# Patient Record
Sex: FEMALE | Race: WHITE | NOT HISPANIC OR LATINO | Employment: OTHER | URBAN - NONMETROPOLITAN AREA
[De-identification: names, ages, dates, MRNs, and addresses within clinical notes are randomized per-mention and may not be internally consistent; named-entity substitution may affect disease eponyms.]

---

## 2017-02-06 ENCOUNTER — TRANSCRIBE ORDERS (OUTPATIENT)
Dept: FAMILY MEDICINE CLINIC | Facility: CLINIC | Age: 57
End: 2017-02-06

## 2017-02-06 ENCOUNTER — LAB (OUTPATIENT)
Dept: LAB | Facility: OTHER | Age: 57
End: 2017-02-06

## 2017-02-06 DIAGNOSIS — I10 PRIMARY HYPERTENSION: ICD-10-CM

## 2017-02-06 DIAGNOSIS — I10 PRIMARY HYPERTENSION: Primary | ICD-10-CM

## 2017-02-06 LAB
ALBUMIN SERPL-MCNC: 4.1 G/DL (ref 3.2–5.5)
ALBUMIN/GLOB SERPL: 1.3 G/DL (ref 1–3)
ALP SERPL-CCNC: 83 U/L (ref 15–121)
ALT SERPL W P-5'-P-CCNC: 19 U/L (ref 10–60)
ANION GAP SERPL CALCULATED.3IONS-SCNC: 10 MMOL/L (ref 5–15)
AST SERPL-CCNC: 23 U/L (ref 10–60)
BASOPHILS # BLD AUTO: 0.02 10*3/MM3 (ref 0–0.2)
BASOPHILS NFR BLD AUTO: 0.4 % (ref 0–2)
BILIRUB SERPL-MCNC: 1 MG/DL (ref 0.2–1)
BUN BLD-MCNC: 20 MG/DL (ref 8–25)
BUN/CREAT SERPL: 40 (ref 7–25)
CALCIUM SPEC-SCNC: 9 MG/DL (ref 8.4–10.8)
CHLORIDE SERPL-SCNC: 105 MMOL/L (ref 100–112)
CHOLEST SERPL-MCNC: 203 MG/DL (ref 150–200)
CO2 SERPL-SCNC: 28 MMOL/L (ref 20–32)
CREAT BLD-MCNC: 0.5 MG/DL (ref 0.4–1.3)
DEPRECATED RDW RBC AUTO: 44.3 FL (ref 36.4–46.3)
EOSINOPHIL # BLD AUTO: 0.14 10*3/MM3 (ref 0–0.7)
EOSINOPHIL NFR BLD AUTO: 3.1 % (ref 0–7)
ERYTHROCYTE [DISTWIDTH] IN BLOOD BY AUTOMATED COUNT: 14 % (ref 11.5–14.5)
GFR SERPL CREATININE-BSD FRML MDRD: 128 ML/MIN/1.73 (ref 51–120)
GLOBULIN UR ELPH-MCNC: 3.1 GM/DL (ref 2.5–4.6)
GLUCOSE BLD-MCNC: 106 MG/DL (ref 70–100)
HCT VFR BLD AUTO: 41.5 % (ref 35–45)
HDLC SERPL-MCNC: 50 MG/DL (ref 35–100)
HGB BLD-MCNC: 13.6 G/DL (ref 12–15.5)
LDLC SERPL CALC-MCNC: 134 MG/DL
LDLC/HDLC SERPL: 2.67 {RATIO}
LYMPHOCYTES # BLD AUTO: 1.44 10*3/MM3 (ref 0.6–4.2)
LYMPHOCYTES NFR BLD AUTO: 31.9 % (ref 10–50)
MCH RBC QN AUTO: 28.9 PG (ref 26.5–34)
MCHC RBC AUTO-ENTMCNC: 32.8 G/DL (ref 31.4–36)
MCV RBC AUTO: 88.3 FL (ref 80–98)
MONOCYTES # BLD AUTO: 0.38 10*3/MM3 (ref 0–0.9)
MONOCYTES NFR BLD AUTO: 8.4 % (ref 0–12)
NEUTROPHILS # BLD AUTO: 2.54 10*3/MM3 (ref 2–8.6)
NEUTROPHILS NFR BLD AUTO: 56.2 % (ref 37–80)
PLATELET # BLD AUTO: 199 10*3/MM3 (ref 150–450)
PMV BLD AUTO: 10.2 FL (ref 8–12)
POTASSIUM BLD-SCNC: 3.7 MMOL/L (ref 3.4–5.4)
PROT SERPL-MCNC: 7.2 G/DL (ref 6.7–8.2)
RBC # BLD AUTO: 4.7 10*6/MM3 (ref 3.77–5.16)
SODIUM BLD-SCNC: 143 MMOL/L (ref 134–146)
TRIGL SERPL-MCNC: 97 MG/DL (ref 35–160)
VLDLC SERPL-MCNC: 19.4 MG/DL
WBC NRBC COR # BLD: 4.52 10*3/MM3 (ref 3.2–9.8)

## 2017-02-06 PROCEDURE — 80061 LIPID PANEL: CPT | Performed by: INTERNAL MEDICINE

## 2017-02-06 PROCEDURE — 85025 COMPLETE CBC W/AUTO DIFF WBC: CPT | Performed by: INTERNAL MEDICINE

## 2017-02-06 PROCEDURE — 36415 COLL VENOUS BLD VENIPUNCTURE: CPT | Performed by: INTERNAL MEDICINE

## 2017-02-06 PROCEDURE — 80053 COMPREHEN METABOLIC PANEL: CPT | Performed by: INTERNAL MEDICINE

## 2017-02-13 ENCOUNTER — OFFICE VISIT (OUTPATIENT)
Dept: FAMILY MEDICINE CLINIC | Facility: CLINIC | Age: 57
End: 2017-02-13

## 2017-02-13 VITALS
DIASTOLIC BLOOD PRESSURE: 80 MMHG | HEART RATE: 64 BPM | WEIGHT: 194 LBS | SYSTOLIC BLOOD PRESSURE: 140 MMHG | HEIGHT: 63 IN | TEMPERATURE: 97.5 F | BODY MASS INDEX: 34.38 KG/M2

## 2017-02-13 DIAGNOSIS — E66.09 NON MORBID OBESITY DUE TO EXCESS CALORIES: ICD-10-CM

## 2017-02-13 DIAGNOSIS — R73.01 IFG (IMPAIRED FASTING GLUCOSE): ICD-10-CM

## 2017-02-13 DIAGNOSIS — I10 ESSENTIAL HYPERTENSION: Primary | ICD-10-CM

## 2017-02-13 DIAGNOSIS — E78.5 HYPERLIPIDEMIA, UNSPECIFIED HYPERLIPIDEMIA TYPE: ICD-10-CM

## 2017-02-13 DIAGNOSIS — R42 VERTIGO: ICD-10-CM

## 2017-02-13 DIAGNOSIS — I87.2 PERIPHERAL VENOUS INSUFFICIENCY: ICD-10-CM

## 2017-02-13 PROCEDURE — 99214 OFFICE O/P EST MOD 30 MIN: CPT | Performed by: INTERNAL MEDICINE

## 2017-02-13 NOTE — PROGRESS NOTES
"Subjective        History of Present Illness     Janet Mejia is a 57 y.o. female here for annual follow up on medical issues including obesity, mild hyperlipidemia, mild impaired fasting glucose, and history of superficial venous thrombosis of the right distal greater saphenous vein treated with six months of Coumadin.  She had extensive workup for the etiology, which revealed no evidence of familial hypercoagulability.  She continues on chronic use of aspirin every other day.  She reports some frequent episodes of vertigo, for which she uses an essential oil behind the bilateral ears with good results.     Repeat DEXA dated 02/15/16 revealed mild osteopenia of the lumbar spine.  We had her start calcium/vitamin D.  She will be due repeat DEXA 02/2018.      She had her routine pap smear/GYN exam by Adry Huynh 11/2016, for which she reports normal results.  She is up to date on her mammogram as well.     We recommended influenza vaccine.  She declines all vacinations.       She reports some occasional episodes of constipation, for which we recommended use of Benefiber.    She had a colonoscopy by Dr. Kwan March 2016.  She reports there were no polyps found.  We will request results from Dr. Dr. Kwan's office.      Blood pressure reasonable at 140/80.  She reports it normally runs in 120/70 range when she checks it at home.   Temperature 97.5 °F (36.4 °C), temperature source Oral, height 63\" (160 cm), weight 194 lb (88 kg). Weight is up 1 pound in the past year.     The patient's relevant past medical, surgical, and social history was reviewed in Epic.   Lab results are reviewed with the patient today.  CBC unremarkable.  Fasting glucose mildly elevated 106, otherwise unremarkable CMP.  Total cholesterol 203.  Triglycerides 97.  HDL 50.  .  LDL/HDL ratio 2.67.  Liver and renal function normal.      Review of Systems   Constitutional: Negative for chills, fatigue and fever.   HENT: Negative for " congestion, ear pain, postnasal drip, sinus pressure and sore throat.    Respiratory: Negative for cough, shortness of breath and wheezing.    Cardiovascular: Negative for chest pain, palpitations and leg swelling.   Gastrointestinal: Negative for abdominal pain, blood in stool, constipation, diarrhea, nausea and vomiting.   Endocrine: Negative for cold intolerance, heat intolerance, polydipsia and polyuria.   Genitourinary: Negative for dysuria, frequency, hematuria and urgency.   Skin: Negative for rash.   Neurological: Negative for syncope and weakness.        Objective     Physical Exam   Constitutional: She is oriented to person, place, and time. She appears well-developed and well-nourished. No distress.   Obese female.    HENT:   Head: Normocephalic and atraumatic.   Nose: Right sinus exhibits no maxillary sinus tenderness and no frontal sinus tenderness. Left sinus exhibits no maxillary sinus tenderness and no frontal sinus tenderness.   Mouth/Throat: Uvula is midline, oropharynx is clear and moist and mucous membranes are normal. No oral lesions. No tonsillar exudate.   Eyes: Conjunctivae and EOM are normal. Pupils are equal, round, and reactive to light.   Neck: Trachea normal. Neck supple. No JVD present. Carotid bruit is not present. No tracheal deviation present. No thyroid mass and no thyromegaly present.   Cardiovascular: Normal rate, regular rhythm and normal heart sounds.   No extrasystoles are present. PMI is not displaced.    No murmur heard.  Pulmonary/Chest: Effort normal and breath sounds normal. No accessory muscle usage. No respiratory distress. She has no decreased breath sounds. She has no wheezes. She has no rhonchi. She has no rales.   Abdominal: Soft. Bowel sounds are normal. She exhibits no distension. There is no hepatosplenomegaly. There is no tenderness.   Obese abdomen limits exam.        Vascular Status -  Her exam exhibits right foot vasculature normal. Her exam exhibits no right  foot edema. Her exam exhibits left foot vasculature normal. Her exam exhibits no left foot edema.  Lymphadenopathy:     She has no cervical adenopathy.   Neurological: She is alert and oriented to person, place, and time. No cranial nerve deficit. Coordination normal.   Skin: Skin is warm, dry and intact. No rash noted. No cyanosis. Nails show no clubbing.   Psychiatric: She has a normal mood and affect. Her speech is normal and behavior is normal. Thought content normal.   Vitals reviewed.     Assessment/Plan      Continue current medications with no changes made today.      We will request results from baseline colonoscopy completed 03/2016 at Dr. Kwan's office.      She declines influenza vaccine.      Recommend diet, exercise, and weight loss efforts.  Set a goal to gradually lose weight and weigh weekly.        Scribed for Dr. Arzate by Haley Sheriff [facility].     Diagnoses and all orders for this visit:    Essential hypertension  -     Comprehensive Metabolic Panel; Future  -     CBC Auto Differential; Future  -     TSH; Future    Hyperlipidemia, unspecified hyperlipidemia type  -     Lipid Panel; Future  -     TSH; Future    Peripheral venous insufficiency  -     TSH; Future    Non morbid obesity due to excess calories  -     TSH; Future    Vertigo  -     TSH; Future    IFG (impaired fasting glucose)  -     Hemoglobin A1c; Future  -     TSH; Future      Lab on 02/06/2017   Component Date Value Ref Range Status   • Glucose 02/06/2017 106* 70 - 100 mg/dL Final   • BUN 02/06/2017 20  8 - 25 mg/dL Final   • Creatinine 02/06/2017 0.50  0.40 - 1.30 mg/dL Final   • Sodium 02/06/2017 143  134 - 146 mmol/L Final   • Potassium 02/06/2017 3.7  3.4 - 5.4 mmol/L Final   • Chloride 02/06/2017 105  100 - 112 mmol/L Final   • CO2 02/06/2017 28.0  20.0 - 32.0 mmol/L Final   • Calcium 02/06/2017 9.0  8.4 - 10.8 mg/dL Final   • Total Protein 02/06/2017 7.2  6.7 - 8.2 g/dL Final   • Albumin 02/06/2017 4.10  3.20 - 5.50 g/dL  Final   • ALT (SGPT) 02/06/2017 19  10 - 60 U/L Final   • AST (SGOT) 02/06/2017 23  10 - 60 U/L Final   • Alkaline Phosphatase 02/06/2017 83  15 - 121 U/L Final   • Total Bilirubin 02/06/2017 1.0  0.2 - 1.0 mg/dL Final   • eGFR Non African Amer 02/06/2017 128* 51 - 120 mL/min/1.73 Final   • Globulin 02/06/2017 3.1  2.5 - 4.6 gm/dL Final   • A/G Ratio 02/06/2017 1.3  1.0 - 3.0 g/dL Final   • BUN/Creatinine Ratio 02/06/2017 40.0* 7.0 - 25.0 Final   • Anion Gap 02/06/2017 10.0  5.0 - 15.0 mmol/L Final   • Total Cholesterol 02/06/2017 203* 150 - 200 mg/dL Final   • Triglycerides 02/06/2017 97  35 - 160 mg/dL Final   • HDL Cholesterol 02/06/2017 50  35 - 100 mg/dL Final   • LDL Cholesterol  02/06/2017 134  mg/dL Final   • VLDL Cholesterol 02/06/2017 19.4  mg/dL Final   • LDL/HDL Ratio 02/06/2017 2.67   Final   • WBC 02/06/2017 4.52  3.20 - 9.80 10*3/mm3 Final   • RBC 02/06/2017 4.70  3.77 - 5.16 10*6/mm3 Final   • Hemoglobin 02/06/2017 13.6  12.0 - 15.5 g/dL Final   • Hematocrit 02/06/2017 41.5  35.0 - 45.0 % Final   • MCV 02/06/2017 88.3  80.0 - 98.0 fL Final   • MCH 02/06/2017 28.9  26.5 - 34.0 pg Final   • MCHC 02/06/2017 32.8  31.4 - 36.0 g/dL Final   • RDW 02/06/2017 14.0  11.5 - 14.5 % Final   • RDW-SD 02/06/2017 44.3  36.4 - 46.3 fl Final   • MPV 02/06/2017 10.2  8.0 - 12.0 fL Final   • Platelets 02/06/2017 199  150 - 450 10*3/mm3 Final   • Neutrophil % 02/06/2017 56.2  37.0 - 80.0 % Final   • Lymphocyte % 02/06/2017 31.9  10.0 - 50.0 % Final   • Monocyte % 02/06/2017 8.4  0.0 - 12.0 % Final   • Eosinophil % 02/06/2017 3.1  0.0 - 7.0 % Final   • Basophil % 02/06/2017 0.4  0.0 - 2.0 % Final   • Neutrophils, Absolute 02/06/2017 2.54  2.00 - 8.60 10*3/mm3 Final   • Lymphocytes, Absolute 02/06/2017 1.44  0.60 - 4.20 10*3/mm3 Final   • Monocytes, Absolute 02/06/2017 0.38  0.00 - 0.90 10*3/mm3 Final   • Eosinophils, Absolute 02/06/2017 0.14  0.00 - 0.70 10*3/mm3 Final   • Basophils, Absolute 02/06/2017 0.02  0.00 - 0.20  10*3/mm3 Final   ]

## 2018-02-12 ENCOUNTER — LAB (OUTPATIENT)
Dept: LAB | Facility: OTHER | Age: 58
End: 2018-02-12

## 2018-02-12 DIAGNOSIS — I87.2 PERIPHERAL VENOUS INSUFFICIENCY: ICD-10-CM

## 2018-02-12 DIAGNOSIS — E78.5 HYPERLIPIDEMIA, UNSPECIFIED HYPERLIPIDEMIA TYPE: ICD-10-CM

## 2018-02-12 DIAGNOSIS — R73.01 IFG (IMPAIRED FASTING GLUCOSE): ICD-10-CM

## 2018-02-12 DIAGNOSIS — I10 ESSENTIAL HYPERTENSION: ICD-10-CM

## 2018-02-12 DIAGNOSIS — E66.09 NON MORBID OBESITY DUE TO EXCESS CALORIES: ICD-10-CM

## 2018-02-12 DIAGNOSIS — R42 VERTIGO: ICD-10-CM

## 2018-02-12 LAB
ALBUMIN SERPL-MCNC: 4.2 G/DL (ref 3.2–5.5)
ALBUMIN/GLOB SERPL: 1.4 G/DL (ref 1–3)
ALP SERPL-CCNC: 70 U/L (ref 15–121)
ALT SERPL W P-5'-P-CCNC: 19 U/L (ref 10–60)
ANION GAP SERPL CALCULATED.3IONS-SCNC: 9 MMOL/L (ref 5–15)
AST SERPL-CCNC: 27 U/L (ref 10–60)
BASOPHILS # BLD AUTO: 0.02 10*3/MM3 (ref 0–0.2)
BASOPHILS NFR BLD AUTO: 0.4 % (ref 0–2)
BILIRUB SERPL-MCNC: 0.8 MG/DL (ref 0.2–1)
BUN BLD-MCNC: 19 MG/DL (ref 8–25)
BUN/CREAT SERPL: 31.7 (ref 7–25)
CALCIUM SPEC-SCNC: 9.2 MG/DL (ref 8.4–10.8)
CHLORIDE SERPL-SCNC: 106 MMOL/L (ref 100–112)
CHOLEST SERPL-MCNC: 202 MG/DL (ref 150–200)
CO2 SERPL-SCNC: 26 MMOL/L (ref 20–32)
CREAT BLD-MCNC: 0.6 MG/DL (ref 0.4–1.3)
DEPRECATED RDW RBC AUTO: 44 FL (ref 36.4–46.3)
EOSINOPHIL # BLD AUTO: 0.1 10*3/MM3 (ref 0–0.7)
EOSINOPHIL NFR BLD AUTO: 2.2 % (ref 0–7)
ERYTHROCYTE [DISTWIDTH] IN BLOOD BY AUTOMATED COUNT: 13.9 % (ref 11.5–14.5)
GFR SERPL CREATININE-BSD FRML MDRD: 103 ML/MIN/1.73 (ref 51–120)
GLOBULIN UR ELPH-MCNC: 3.1 GM/DL (ref 2.5–4.6)
GLUCOSE BLD-MCNC: 102 MG/DL (ref 70–100)
HBA1C MFR BLD: 5.3 % (ref 4–5.6)
HCT VFR BLD AUTO: 41.4 % (ref 35–45)
HDLC SERPL-MCNC: 52 MG/DL (ref 35–100)
HGB BLD-MCNC: 13.5 G/DL (ref 12–15.5)
LDLC SERPL CALC-MCNC: 129 MG/DL
LDLC/HDLC SERPL: 2.49 {RATIO}
LYMPHOCYTES # BLD AUTO: 1.54 10*3/MM3 (ref 0.6–4.2)
LYMPHOCYTES NFR BLD AUTO: 33.7 % (ref 10–50)
MCH RBC QN AUTO: 29 PG (ref 26.5–34)
MCHC RBC AUTO-ENTMCNC: 32.6 G/DL (ref 31.4–36)
MCV RBC AUTO: 89 FL (ref 80–98)
MONOCYTES # BLD AUTO: 0.41 10*3/MM3 (ref 0–0.9)
MONOCYTES NFR BLD AUTO: 9 % (ref 0–12)
NEUTROPHILS # BLD AUTO: 2.5 10*3/MM3 (ref 2–8.6)
NEUTROPHILS NFR BLD AUTO: 54.7 % (ref 37–80)
PLATELET # BLD AUTO: 224 10*3/MM3 (ref 150–450)
PMV BLD AUTO: 10.1 FL (ref 8–12)
POTASSIUM BLD-SCNC: 3.9 MMOL/L (ref 3.4–5.4)
PROT SERPL-MCNC: 7.3 G/DL (ref 6.7–8.2)
RBC # BLD AUTO: 4.65 10*6/MM3 (ref 3.77–5.16)
SODIUM BLD-SCNC: 141 MMOL/L (ref 134–146)
TRIGL SERPL-MCNC: 103 MG/DL (ref 35–160)
TSH SERPL DL<=0.05 MIU/L-ACNC: 2.38 MIU/ML (ref 0.46–4.68)
VLDLC SERPL-MCNC: 20.6 MG/DL
WBC NRBC COR # BLD: 4.57 10*3/MM3 (ref 3.2–9.8)

## 2018-02-12 PROCEDURE — 85025 COMPLETE CBC W/AUTO DIFF WBC: CPT | Performed by: INTERNAL MEDICINE

## 2018-02-12 PROCEDURE — 83036 HEMOGLOBIN GLYCOSYLATED A1C: CPT | Performed by: INTERNAL MEDICINE

## 2018-02-12 PROCEDURE — 80061 LIPID PANEL: CPT | Performed by: INTERNAL MEDICINE

## 2018-02-12 PROCEDURE — 84443 ASSAY THYROID STIM HORMONE: CPT | Performed by: INTERNAL MEDICINE

## 2018-02-12 PROCEDURE — 80053 COMPREHEN METABOLIC PANEL: CPT | Performed by: INTERNAL MEDICINE

## 2018-02-19 ENCOUNTER — OFFICE VISIT (OUTPATIENT)
Dept: FAMILY MEDICINE CLINIC | Facility: CLINIC | Age: 58
End: 2018-02-19

## 2018-02-19 VITALS
HEART RATE: 72 BPM | WEIGHT: 191.6 LBS | TEMPERATURE: 98.5 F | BODY MASS INDEX: 33.95 KG/M2 | SYSTOLIC BLOOD PRESSURE: 120 MMHG | DIASTOLIC BLOOD PRESSURE: 78 MMHG | HEIGHT: 63 IN

## 2018-02-19 DIAGNOSIS — N95.1 MENOPAUSAL FLUSHING: Chronic | ICD-10-CM

## 2018-02-19 DIAGNOSIS — R73.01 IFG (IMPAIRED FASTING GLUCOSE): Chronic | ICD-10-CM

## 2018-02-19 DIAGNOSIS — E78.5 HYPERLIPIDEMIA, UNSPECIFIED HYPERLIPIDEMIA TYPE: Chronic | ICD-10-CM

## 2018-02-19 DIAGNOSIS — M85.88 OSTEOPENIA OF SPINE: Chronic | ICD-10-CM

## 2018-02-19 DIAGNOSIS — E66.09 CLASS 1 OBESITY DUE TO EXCESS CALORIES WITHOUT SERIOUS COMORBIDITY WITH BODY MASS INDEX (BMI) OF 33.0 TO 33.9 IN ADULT: Chronic | ICD-10-CM

## 2018-02-19 DIAGNOSIS — Z86.718 HISTORY OF VENOUS THROMBOSIS: Primary | Chronic | ICD-10-CM

## 2018-02-19 DIAGNOSIS — I87.2 PERIPHERAL VENOUS INSUFFICIENCY: Chronic | ICD-10-CM

## 2018-02-19 PROCEDURE — 99214 OFFICE O/P EST MOD 30 MIN: CPT | Performed by: INTERNAL MEDICINE

## 2018-02-19 NOTE — PATIENT INSTRUCTIONS
Exercising to Lose Weight  Exercising can help you to lose weight. In order to lose weight through exercise, you need to do vigorous-intensity exercise. You can tell that you are exercising with vigorous intensity if you are breathing very hard and fast and cannot hold a conversation while exercising.  Moderate-intensity exercise helps to maintain your current weight. You can tell that you are exercising at a moderate level if you have a higher heart rate and faster breathing, but you are still able to hold a conversation.  How often should I exercise?  Choose an activity that you enjoy and set realistic goals. Your health care provider can help you to make an activity plan that works for you. Exercise regularly as directed by your health care provider. This may include:  · Doing resistance training twice each week, such as:  ¨ Push-ups.  ¨ Sit-ups.  ¨ Lifting weights.  ¨ Using resistance bands.  · Doing a given intensity of exercise for a given amount of time. Choose from these options:  ¨ 150 minutes of moderate-intensity exercise every week.  ¨ 75 minutes of vigorous-intensity exercise every week.  ¨ A mix of moderate-intensity and vigorous-intensity exercise every week.  Children, pregnant women, people who are out of shape, people who are overweight, and older adults may need to consult a health care provider for individual recommendations. If you have any sort of medical condition, be sure to consult your health care provider before starting a new exercise program.  What are some activities that can help me to lose weight?  · Walking at a rate of at least 4.5 miles an hour.  · Jogging or running at a rate of 5 miles per hour.  · Biking at a rate of at least 10 miles per hour.  · Lap swimming.  · Roller-skating or in-line skating.  · Cross-country skiing.  · Vigorous competitive sports, such as football, basketball, and soccer.  · Jumping rope.  · Aerobic dancing.  How can I be more active in my day-to-day  activities?  · Use the stairs instead of the elevator.  · Take a walk during your lunch break.  · If you drive, park your car farther away from work or school.  · If you take public transportation, get off one stop early and walk the rest of the way.  · Make all of your phone calls while standing up and walking around.  · Get up, stretch, and walk around every 30 minutes throughout the day.  What guidelines should I follow while exercising?  · Do not exercise so much that you hurt yourself, feel dizzy, or get very short of breath.  · Consult your health care provider prior to starting a new exercise program.  · Wear comfortable clothes and shoes with good support.  · Drink plenty of water while you exercise to prevent dehydration or heat stroke. Body water is lost during exercise and must be replaced.  · Work out until you breathe faster and your heart beats faster.  This information is not intended to replace advice given to you by your health care provider. Make sure you discuss any questions you have with your health care provider.  Document Released: 01/20/2012 Document Revised: 05/25/2017 Document Reviewed: 05/21/2015  [employer] Interactive Patient Education © 2017 [employer]      Calorie Counting for Weight Loss  Calories are units of energy. Your body needs a certain amount of calories from food to keep you going throughout the day. When you eat more calories than your body needs, your body stores the extra calories as fat. When you eat fewer calories than your body needs, your body burns fat to get the energy it needs.  Calorie counting means keeping track of how many calories you eat and drink each day. Calorie counting can be helpful if you need to lose weight. If you make sure to eat fewer calories than your body needs, you should lose weight. Ask your health care provider what a healthy weight is for you.  For calorie counting to work, you will need to eat the right number of calories in a day in order  to lose a healthy amount of weight per week. A dietitian can help you determine how many calories you need in a day and will give you suggestions on how to reach your calorie goal.  · A healthy amount of weight to lose per week is usually 1-2 lb (0.5-0.9 kg). This usually means that your daily calorie intake should be reduced by 500-750 calories.  · Eating 1,200 - 1,500 calories per day can help most women lose weight.  · Eating 1,500 - 1,800 calories per day can help most men lose weight.  What is my plan?  My goal is to have __________ calories per day.  If I have this many calories per day, I should lose around __________ pounds per week.  What do I need to know about calorie counting?  In order to meet your daily calorie goal, you will need to:  · Find out how many calories are in each food you would like to eat. Try to do this before you eat.  · Decide how much of the food you plan to eat.  · Write down what you ate and how many calories it had. Doing this is called keeping a food log.  To successfully lose weight, it is important to balance calorie counting with a healthy lifestyle that includes regular activity. Aim for 150 minutes of moderate exercise (such as walking) or 75 minutes of vigorous exercise (such as running) each week.  Where do I find calorie information?     The number of calories in a food can be found on a Nutrition Facts label. If a food does not have a Nutrition Facts label, try to look up the calories online or ask your dietitian for help.  Remember that calories are listed per serving. If you choose to have more than one serving of a food, you will have to multiply the calories per serving by the amount of servings you plan to eat. For example, the label on a package of bread might say that a serving size is 1 slice and that there are 90 calories in a serving. If you eat 1 slice, you will have eaten 90 calories. If you eat 2 slices, you will have eaten 180 calories.  How do I keep a food  "log?  Immediately after each meal, record the following information in your food log:  · What you ate. Don't forget to include toppings, sauces, and other extras on the food.  · How much you ate. This can be measured in cups, ounces, or number of items.  · How many calories each food and drink had.  · The total number of calories in the meal.  Keep your food log near you, such as in a small notebook in your pocket, or use a mobile luzmaria or website. Some programs will calculate calories for you and show you how many calories you have left for the day to meet your goal.  What are some calorie counting tips?  · Use your calories on foods and drinks that will fill you up and not leave you hungry:  ¨ Some examples of foods that fill you up are nuts and nut butters, vegetables, lean proteins, and high-fiber foods like whole grains. High-fiber foods are foods with more than 5 g fiber per serving.  ¨ Drinks such as sodas, specialty coffee drinks, alcohol, and juices have a lot of calories, yet do not fill you up.  · Eat nutritious foods and avoid empty calories. Empty calories are calories you get from foods or beverages that do not have many vitamins or protein, such as candy, sweets, and soda. It is better to have a nutritious high-calorie food (such as an avocado) than a food with few nutrients (such as a bag of chips).  · Know how many calories are in the foods you eat most often. This will help you calculate calorie counts faster.  · Pay attention to calories in drinks. Low-calorie drinks include water and unsweetened drinks.  · Pay attention to nutrition labels for \"low fat\" or \"fat free\" foods. These foods sometimes have the same amount of calories or more calories than the full fat versions. They also often have added sugar, starch, or salt, to make up for flavor that was removed with the fat.  · Find a way of tracking calories that works for you. Get creative. Try different apps or programs if writing down calories " does not work for you.  What are some portion control tips?  · Know how many calories are in a serving. This will help you know how many servings of a certain food you can have.  · Use a measuring cup to measure serving sizes. You could also try weighing out portions on a kitchen scale. With time, you will be able to estimate serving sizes for some foods.  · Take some time to put servings of different foods on your favorite plates, bowls, and cups so you know what a serving looks like.  · Try not to eat straight from a bag or box. Doing this can lead to overeating. Put the amount you would like to eat in a cup or on a plate to make sure you are eating the right portion.  · Use smaller plates, glasses, and bowls to prevent overeating.  · Try not to multitask (for example, watch TV or use your computer) while eating. If it is time to eat, sit down at a table and enjoy your food. This will help you to know when you are full. It will also help you to be aware of what you are eating and how much you are eating.  What are tips for following this plan?  Reading food labels   · Check the calorie count compared to the serving size. The serving size may be smaller than what you are used to eating.  · Check the source of the calories. Make sure the food you are eating is high in vitamins and protein and low in saturated and trans fats.  Shopping   · Read nutrition labels while you shop. This will help you make healthy decisions before you decide to purchase your food.  · Make a grocery list and stick to it.  Cooking   · Try to cook your favorite foods in a healthier way. For example, try baking instead of frying.  · Use low-fat dairy products.  Meal planning   · Use more fruits and vegetables. Half of your plate should be fruits and vegetables.  · Include lean proteins like poultry and fish.  How do I count calories when eating out?  · Ask for smaller portion sizes.  · Consider sharing an entree and sides instead of getting  your own entree.  · If you get your own entree, eat only half. Ask for a box at the beginning of your meal and put the rest of your entree in it so you are not tempted to eat it.  · If calories are listed on the menu, choose the lower calorie options.  · Choose dishes that include vegetables, fruits, whole grains, low-fat dairy products, and lean protein.  · Choose items that are boiled, broiled, grilled, or steamed. Stay away from items that are buttered, battered, fried, or served with cream sauce. Items labeled “crispy” are usually fried, unless stated otherwise.  · Choose water, low-fat milk, unsweetened iced tea, or other drinks without added sugar. If you want an alcoholic beverage, choose a lower calorie option such as a glass of wine or light beer.  · Ask for dressings, sauces, and syrups on the side. These are usually high in calories, so you should limit the amount you eat.  · If you want a salad, choose a garden salad and ask for grilled meats. Avoid extra toppings like klein, cheese, or fried items. Ask for the dressing on the side, or ask for olive oil and vinegar or lemon to use as dressing.  · Estimate how many servings of a food you are given. For example, a serving of cooked rice is ½ cup or about the size of half a baseball. Knowing serving sizes will help you be aware of how much food you are eating at restaurants. The list below tells you how big or small some common portion sizes are based on everyday objects:  ¨ 1 oz--4 stacked dice.  ¨ 3 oz--1 deck of cards.  ¨ 1 tsp--1 die.  ¨ 1 Tbsp--½ a ping-pong ball.  ¨ 2 Tbsp--1 ping-pong ball.  ¨ ½ cup--½ baseball.  ¨ 1 cup--1 baseball.  Summary  · Calorie counting means keeping track of how many calories you eat and drink each day. If you eat fewer calories than your body needs, you should lose weight.  · A healthy amount of weight to lose per week is usually 1-2 lb (0.5-0.9 kg). This usually means reducing your daily calorie intake by 500-750  calories.  · The number of calories in a food can be found on a Nutrition Facts label. If a food does not have a Nutrition Facts label, try to look up the calories online or ask your dietitian for help.  · Use your calories on foods and drinks that will fill you up, and not on foods and drinks that will leave you hungry.  · Use smaller plates, glasses, and bowls to prevent overeating.  This information is not intended to replace advice given to you by your health care provider. Make sure you discuss any questions you have with your health care provider.  Document Released: 12/18/2006 Document Revised: 11/17/2017 Document Reviewed: 11/17/2017  [employer] Patient Education © 2017 [employer]

## 2018-02-19 NOTE — PROGRESS NOTES
Subjective        History of Present Illness     Janet Mejia is a 58 y.o. female for annual follow up on medical issues including obesity, mild hyperlipidemia, mild impaired fasting glucose, and history of superficial venous thrombosis of the right distal greater saphenous vein treated with six months of Coumadin with no evidence of recurrence.  She does have some mild residual lower extremity edema, for which I recommended use of compression stockings.       Repeat DEXA dated 02/15/16 revealed mild osteopenia of the lumbar spine.  She is not taking calcium/vitamin D supplements.  She is due repeat DEXA this month and agrees to schedule this.    She had a colonoscopy by Dr. Kwan March 2016 with no polyps found. There were a few small and large-mouth diverticuli in the sigmoid and descending colon as well as internal hemorrhoids.     She reports intermittent episodes of difficulty sleeping.  We reviewed good sleep hygiene habits.   She isn't interested in taking any medications for sleep.  I encouraged her to increase physical activity to help with conditioning.      She had her routine pap smear/GYN exam by Adry Huynh, for which she reports normal results.  She is up to date on her mammogram as well.     She declines influenza vaccine this year.      Weight is down 3 pounds in the past year with dietary improvement.  She also has eliminated sugar sweetened soft drinks.   Blood pressure is at goal.         The patient's relevant past medical, surgical, and social history was reviewed in Epic.   Lab results are reviewed with the patient today.  CBC unremarkable. Fasting glucose 102.  A1c is 5.3. Liver and renal function normal.  Total cholesterol slightly above goal 202. HDL 52.  .  Triglycerides 103. Thyroid function normal.     Review of Systems   Constitutional: Negative for chills, fatigue and fever.   HENT: Negative for congestion, ear pain, postnasal drip, sinus pressure and sore throat.   "  Respiratory: Negative for cough, shortness of breath and wheezing.    Cardiovascular: Negative for chest pain, palpitations and leg swelling.   Gastrointestinal: Negative for abdominal pain, blood in stool, constipation, diarrhea, nausea and vomiting.   Endocrine: Negative for cold intolerance, heat intolerance, polydipsia and polyuria.   Genitourinary: Negative for dysuria, frequency, hematuria and urgency.   Skin: Negative for rash.   Neurological: Negative for syncope and weakness.        Objective     Vitals:    02/19/18 1301   BP: 120/78   Pulse: 72   Temp: 98.5 °F (36.9 °C)   TempSrc: Oral   Weight: 86.9 kg (191 lb 9.6 oz)   Height: 160 cm (63\")     Physical Exam   Constitutional: She is oriented to person, place, and time. She appears well-developed and well-nourished. No distress.   Obese female.     HENT:   Head: Normocephalic and atraumatic.   Nose: Right sinus exhibits no maxillary sinus tenderness and no frontal sinus tenderness. Left sinus exhibits no maxillary sinus tenderness and no frontal sinus tenderness.   Mouth/Throat: Uvula is midline, oropharynx is clear and moist and mucous membranes are normal. No oral lesions. No tonsillar exudate.   Eyes: Conjunctivae and EOM are normal. Pupils are equal, round, and reactive to light.   Neck: Trachea normal. Neck supple. No JVD present. Carotid bruit is not present. No tracheal deviation present. No thyroid mass and no thyromegaly present.   Cardiovascular: Normal rate, regular rhythm, normal heart sounds and intact distal pulses.   No extrasystoles are present. PMI is not displaced.    No murmur heard.  Pulmonary/Chest: Effort normal and breath sounds normal. No accessory muscle usage. No respiratory distress. She has no decreased breath sounds. She has no wheezes. She has no rhonchi. She has no rales.   Abdominal: Soft. Bowel sounds are normal. She exhibits no distension. There is no hepatosplenomegaly. There is no tenderness.   Obese abdomen limits " exam.       Vascular Status -  Her exam exhibits right foot vasculature normal and right foot edema (Trace edema bilateral lower extremities ). Her exam exhibits left foot vasculature normal and left foot edema.  Lymphadenopathy:     She has no cervical adenopathy.   Neurological: She is alert and oriented to person, place, and time. No cranial nerve deficit. Coordination normal.   Skin: Skin is warm, dry and intact. No rash noted. No cyanosis. Nails show no clubbing.   Psychiatric: She has a normal mood and affect. Her speech is normal and behavior is normal. Judgment and thought content normal.   Vitals reviewed.       Assessment/Plan      I recommended compression stockings for the mild lower extremity edema.     An order is sent to schedule repeat DEXA to evaluate osteopenia. We will notify her of results.     She declines influenza vaccine.     Intensify diabetic diet, exercise, and weight loss efforts.  Written literature regarding diet and exercise included in patient's AVS today.    Continue daily aspirin. She will return for follow up in six months with fasting labs one week prior.     Scribed for Dr. Arzate by Haley Sheriff [facility].     Diagnoses and all orders for this visit:    History of venous thrombosis    Hyperlipidemia, unspecified hyperlipidemia type    Peripheral venous insufficiency    Class 1 obesity due to excess calories without serious comorbidity with body mass index (BMI) of 33.0 to 33.9 in adult    Osteopenia of spine  -     DEXA Bone Density Axial; Future    Menopausal flushing    IFG (impaired fasting glucose)    Other orders  -     Camphor (JOINTFLEX EX); Apply 1 application topically.  -     Calcium Carb-Cholecalciferol (CALCIUM-VITAMIN D) 500-200 MG-UNIT per tablet; Take 1 tablet by mouth Daily.        Lab on 02/12/2018   Component Date Value Ref Range Status   • Glucose 02/12/2018 102* 70 - 100 mg/dL Final   • BUN 02/12/2018 19  8 - 25 mg/dL Final   • Creatinine 02/12/2018 0.60   0.40 - 1.30 mg/dL Final   • Sodium 02/12/2018 141  134 - 146 mmol/L Final   • Potassium 02/12/2018 3.9  3.4 - 5.4 mmol/L Final   • Chloride 02/12/2018 106  100 - 112 mmol/L Final   • CO2 02/12/2018 26.0  20.0 - 32.0 mmol/L Final   • Calcium 02/12/2018 9.2  8.4 - 10.8 mg/dL Final   • Total Protein 02/12/2018 7.3  6.7 - 8.2 g/dL Final   • Albumin 02/12/2018 4.20  3.20 - 5.50 g/dL Final   • ALT (SGPT) 02/12/2018 19  10 - 60 U/L Final   • AST (SGOT) 02/12/2018 27  10 - 60 U/L Final   • Alkaline Phosphatase 02/12/2018 70  15 - 121 U/L Final   • Total Bilirubin 02/12/2018 0.8  0.2 - 1.0 mg/dL Final   • eGFR Non African Amer 02/12/2018 103  51 - 120 mL/min/1.73 Final   • Globulin 02/12/2018 3.1  2.5 - 4.6 gm/dL Final   • A/G Ratio 02/12/2018 1.4  1.0 - 3.0 g/dL Final   • BUN/Creatinine Ratio 02/12/2018 31.7* 7.0 - 25.0 Final   • Anion Gap 02/12/2018 9.0  5.0 - 15.0 mmol/L Final   • Hemoglobin A1C 02/12/2018 5.3  4 - 5.6 % Final   • Total Cholesterol 02/12/2018 202* 150 - 200 mg/dL Final   • Triglycerides 02/12/2018 103  35 - 160 mg/dL Final   • HDL Cholesterol 02/12/2018 52  35 - 100 mg/dL Final   • LDL Cholesterol  02/12/2018 129  mg/dL Final   • VLDL Cholesterol 02/12/2018 20.6  mg/dL Final   • LDL/HDL Ratio 02/12/2018 2.49   Final   • WBC 02/12/2018 4.57  3.20 - 9.80 10*3/mm3 Final   • RBC 02/12/2018 4.65  3.77 - 5.16 10*6/mm3 Final   • Hemoglobin 02/12/2018 13.5  12.0 - 15.5 g/dL Final   • Hematocrit 02/12/2018 41.4  35.0 - 45.0 % Final   • MCV 02/12/2018 89.0  80.0 - 98.0 fL Final   • MCH 02/12/2018 29.0  26.5 - 34.0 pg Final   • MCHC 02/12/2018 32.6  31.4 - 36.0 g/dL Final   • RDW 02/12/2018 13.9  11.5 - 14.5 % Final   • RDW-SD 02/12/2018 44.0  36.4 - 46.3 fl Final   • MPV 02/12/2018 10.1  8.0 - 12.0 fL Final   • Platelets 02/12/2018 224  150 - 450 10*3/mm3 Final   • Neutrophil % 02/12/2018 54.7  37.0 - 80.0 % Final   • Lymphocyte % 02/12/2018 33.7  10.0 - 50.0 % Final   • Monocyte % 02/12/2018 9.0  0.0 - 12.0 %  Final   • Eosinophil % 02/12/2018 2.2  0.0 - 7.0 % Final   • Basophil % 02/12/2018 0.4  0.0 - 2.0 % Final   • Neutrophils, Absolute 02/12/2018 2.50  2.00 - 8.60 10*3/mm3 Final   • Lymphocytes, Absolute 02/12/2018 1.54  0.60 - 4.20 10*3/mm3 Final   • Monocytes, Absolute 02/12/2018 0.41  0.00 - 0.90 10*3/mm3 Final   • Eosinophils, Absolute 02/12/2018 0.10  0.00 - 0.70 10*3/mm3 Final   • Basophils, Absolute 02/12/2018 0.02  0.00 - 0.20 10*3/mm3 Final   • TSH 02/12/2018 2.380  0.460 - 4.680 mIU/mL Final   ]

## 2018-08-07 ENCOUNTER — OFFICE VISIT (OUTPATIENT)
Dept: FAMILY MEDICINE CLINIC | Facility: CLINIC | Age: 58
End: 2018-08-07

## 2018-08-07 VITALS
WEIGHT: 193.6 LBS | HEART RATE: 68 BPM | TEMPERATURE: 98.2 F | DIASTOLIC BLOOD PRESSURE: 84 MMHG | HEIGHT: 63 IN | SYSTOLIC BLOOD PRESSURE: 130 MMHG | BODY MASS INDEX: 34.3 KG/M2

## 2018-08-07 DIAGNOSIS — M62.838 MUSCLE SPASMS OF NECK: ICD-10-CM

## 2018-08-07 DIAGNOSIS — M47.812 DJD (DEGENERATIVE JOINT DISEASE), CERVICAL: Primary | ICD-10-CM

## 2018-08-07 DIAGNOSIS — M25.512 ACUTE PAIN OF LEFT SHOULDER: ICD-10-CM

## 2018-08-07 PROCEDURE — 99214 OFFICE O/P EST MOD 30 MIN: CPT | Performed by: INTERNAL MEDICINE

## 2018-08-07 RX ORDER — TIZANIDINE 4 MG/1
TABLET ORAL
Qty: 30 TABLET | Refills: 0 | Status: SHIPPED | OUTPATIENT
Start: 2018-08-07 | End: 2018-10-24

## 2018-08-07 RX ORDER — OMEPRAZOLE 40 MG/1
40 CAPSULE, DELAYED RELEASE ORAL DAILY
Qty: 30 CAPSULE | Refills: 0 | Status: SHIPPED | OUTPATIENT
Start: 2018-08-07 | End: 2018-10-24

## 2018-08-07 RX ORDER — MELOXICAM 15 MG/1
15 TABLET ORAL DAILY PRN
Qty: 30 TABLET | Refills: 0 | Status: SHIPPED | OUTPATIENT
Start: 2018-08-07 | End: 2018-10-24

## 2018-08-07 NOTE — PROGRESS NOTES
Subjective        History of Present Illness     Janet Mejia is a 58 y.o. female who reports acute onset of left shoulder pain three weeks ago.  Initially, she denied any prior trauma or precipitating factor that she is aware of.  In further discussion, she does recall two falls within the past few months where she landed on the left shoulder. She is a side sleeper and sleep has been difficult due to pain.  Exam reveals supraspinatus tenderness, but negative supraspinatus sign. Her external rotation is severely limited.  She cannot raise her left arm above the level of the shoulder.  I recommended a referral to orthopedist and we will get x-rays of the shoulder today.  She is in agreement.      Her pain appears to be due to two separate issues, neck and left shoulder.  She describes left posterior neck pain and muscle spasm consistent with cervical degenerative disc disease.  She is describing some radicular symptoms to the left upper extremity.  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    Exam reveals kyphotic curvature, paraspinal muscle spasm and and trapezius spasm bilaterally, greater on the left.  I have demonstrated home exercises and deep massage therapy to help relieve the muscle spasm.  A prescription is sent for anti-inflammatory and muscle relaxer to use as well.            Review of Systems   Constitutional: Negative for chills, fatigue and fever.   HENT: Negative for congestion, ear pain, postnasal drip, sinus pressure and sore throat.    Respiratory: Negative for cough, shortness of breath and wheezing.    Cardiovascular: Negative for chest pain, palpitations and leg swelling.   Gastrointestinal: Negative for abdominal pain, blood in stool, constipation, diarrhea, nausea and vomiting.   Endocrine: Negative for cold intolerance, heat intolerance, polydipsia and polyuria.   Genitourinary: Negative for dysuria, frequency, hematuria  "and urgency.   Musculoskeletal: Positive for neck pain.        Left shoulder pain   Skin: Negative for rash.   Neurological: Negative for syncope and weakness.        Objective     Vitals:    08/07/18 1522   BP: 130/84   Pulse: 68   Temp: 98.2 °F (36.8 °C)   TempSrc: Oral   Weight: 87.8 kg (193 lb 9.6 oz)   Height: 160 cm (63\")     Physical Exam   Constitutional: She is oriented to person, place, and time. She appears well-developed and well-nourished. No distress.   HENT:   Head: Normocephalic and atraumatic.   Nose: Nose normal.   Mouth/Throat: Oropharynx is clear and moist. No oropharyngeal exudate.   Eyes: Pupils are equal, round, and reactive to light. EOM are normal.   Neck: Neck supple. No JVD present. No thyromegaly present.   Cardiovascular: Normal rate, regular rhythm and normal heart sounds.    Pulmonary/Chest: Effort normal and breath sounds normal. No accessory muscle usage. No respiratory distress. She has no wheezes. She has no rales.   Abdominal: Soft. Bowel sounds are normal. She exhibits no distension. There is no tenderness.   Musculoskeletal: She exhibits no edema.   Exam of left shoulder reveals supraspinatus tenderness, but negative supraspinatus sign.   She cannot bring left arm above level of the shoulder. External rotation is severely limited.     Exam of the neck reveals paraspinal muscle spasm and and trapezius spasm bilaterally, greater on the left.         Lymphadenopathy:     She has no cervical adenopathy.   Neurological: She is alert and oriented to person, place, and time. No cranial nerve deficit.   Psychiatric: She has a normal mood and affect. Her speech is normal and behavior is normal. Judgment and thought content normal.     Future Appointments  Date Time Provider Department Center   2/25/2019 1:00 PM Faraz Arzate MD [facility] None         Assessment/Plan      X-rays of the left shoulder spine and cervical spine today.  Prescription is sent for Mobic 15 mg q.d. with food and " Zanaflex 4 mg to take 1/2 to 1 tablet q.h.s. for muscle spasm.  I am sending a prescription for Prilosec to help with any GI side effects due to the NSAID use. She has a history of NSAIDs producing GI intolerance.  She should avoid additional OTC NSAIDs with the Mobic, but may add Tylenol per label directions if needed.        Refer to orthopedist, Dr. Rico, for evaluation of the left shoulder pain.   The Mobic and Zanaflex will help with the neck pain as well.         Demonstrated home exercises to repeat a minimum of three times daily to help improve posture and deep tendon massage to repeat several times daily to help relieve the cervical muscle spasm.        Return in February for routine follow up with fasting labs one week prior or sooner if neck and shoulder pain persists.      Scribed for Dr. Arzate by Haley Sheriff [facility].     Diagnoses and all orders for this visit:    DJD (degenerative joint disease), cervical  -     XR Spine Cervical 2 or 3 View    Muscle spasms of neck    Acute pain of left shoulder  -     XR Shoulder 2+ View Left  -     Ambulatory Referral to Orthopedic Surgery    Other orders  -     meloxicam (MOBIC) 15 MG tablet; Take 1 tablet by mouth Daily As Needed (pain). Take with food  -     tiZANidine (ZANAFLEX) 4 MG tablet; 1/2-1 qhs prn muscle spasms  -     omeprazole (PRILOSEC) 40 MG capsule; Take 1 capsule by mouth Daily. For stomach acid or reflux        No visits with results within 3 Week(s) from this visit.   Latest known visit with results is:   Lab on 02/12/2018   Component Date Value Ref Range Status   • Glucose 02/12/2018 102* 70 - 100 mg/dL Final   • BUN 02/12/2018 19  8 - 25 mg/dL Final   • Creatinine 02/12/2018 0.60  0.40 - 1.30 mg/dL Final   • Sodium 02/12/2018 141  134 - 146 mmol/L Final   • Potassium 02/12/2018 3.9  3.4 - 5.4 mmol/L Final   • Chloride 02/12/2018 106  100 - 112 mmol/L Final   • CO2 02/12/2018 26.0  20.0 - 32.0 mmol/L Final   • Calcium 02/12/2018 9.2  8.4 -  10.8 mg/dL Final   • Total Protein 02/12/2018 7.3  6.7 - 8.2 g/dL Final   • Albumin 02/12/2018 4.20  3.20 - 5.50 g/dL Final   • ALT (SGPT) 02/12/2018 19  10 - 60 U/L Final   • AST (SGOT) 02/12/2018 27  10 - 60 U/L Final   • Alkaline Phosphatase 02/12/2018 70  15 - 121 U/L Final   • Total Bilirubin 02/12/2018 0.8  0.2 - 1.0 mg/dL Final   • eGFR Non African Amer 02/12/2018 103  51 - 120 mL/min/1.73 Final   • Globulin 02/12/2018 3.1  2.5 - 4.6 gm/dL Final   • A/G Ratio 02/12/2018 1.4  1.0 - 3.0 g/dL Final   • BUN/Creatinine Ratio 02/12/2018 31.7* 7.0 - 25.0 Final   • Anion Gap 02/12/2018 9.0  5.0 - 15.0 mmol/L Final   • Hemoglobin A1C 02/12/2018 5.3  4 - 5.6 % Final   • Total Cholesterol 02/12/2018 202* 150 - 200 mg/dL Final   • Triglycerides 02/12/2018 103  35 - 160 mg/dL Final   • HDL Cholesterol 02/12/2018 52  35 - 100 mg/dL Final   • LDL Cholesterol  02/12/2018 129  mg/dL Final   • VLDL Cholesterol 02/12/2018 20.6  mg/dL Final   • LDL/HDL Ratio 02/12/2018 2.49   Final   • WBC 02/12/2018 4.57  3.20 - 9.80 10*3/mm3 Final   • RBC 02/12/2018 4.65  3.77 - 5.16 10*6/mm3 Final   • Hemoglobin 02/12/2018 13.5  12.0 - 15.5 g/dL Final   • Hematocrit 02/12/2018 41.4  35.0 - 45.0 % Final   • MCV 02/12/2018 89.0  80.0 - 98.0 fL Final   • MCH 02/12/2018 29.0  26.5 - 34.0 pg Final   • MCHC 02/12/2018 32.6  31.4 - 36.0 g/dL Final   • RDW 02/12/2018 13.9  11.5 - 14.5 % Final   • RDW-SD 02/12/2018 44.0  36.4 - 46.3 fl Final   • MPV 02/12/2018 10.1  8.0 - 12.0 fL Final   • Platelets 02/12/2018 224  150 - 450 10*3/mm3 Final   • Neutrophil % 02/12/2018 54.7  37.0 - 80.0 % Final   • Lymphocyte % 02/12/2018 33.7  10.0 - 50.0 % Final   • Monocyte % 02/12/2018 9.0  0.0 - 12.0 % Final   • Eosinophil % 02/12/2018 2.2  0.0 - 7.0 % Final   • Basophil % 02/12/2018 0.4  0.0 - 2.0 % Final   • Neutrophils, Absolute 02/12/2018 2.50  2.00 - 8.60 10*3/mm3 Final   • Lymphocytes, Absolute 02/12/2018 1.54  0.60 - 4.20 10*3/mm3 Final   • Monocytes,  Absolute 02/12/2018 0.41  0.00 - 0.90 10*3/mm3 Final   • Eosinophils, Absolute 02/12/2018 0.10  0.00 - 0.70 10*3/mm3 Final   • Basophils, Absolute 02/12/2018 0.02  0.00 - 0.20 10*3/mm3 Final   • TSH 02/12/2018 2.380  0.460 - 4.680 mIU/mL Final   ]

## 2018-08-28 ENCOUNTER — OFFICE VISIT (OUTPATIENT)
Dept: ORTHOPEDIC SURGERY | Facility: CLINIC | Age: 58
End: 2018-08-28

## 2018-08-28 VITALS — BODY MASS INDEX: 34.2 KG/M2 | HEIGHT: 63 IN | WEIGHT: 193 LBS

## 2018-08-28 DIAGNOSIS — M75.02 ADHESIVE CAPSULITIS OF LEFT SHOULDER: ICD-10-CM

## 2018-08-28 DIAGNOSIS — M75.102 ROTATOR CUFF SYNDROME, LEFT: ICD-10-CM

## 2018-08-28 DIAGNOSIS — M25.512 ACUTE PAIN OF LEFT SHOULDER: Primary | ICD-10-CM

## 2018-08-28 PROCEDURE — 99203 OFFICE O/P NEW LOW 30 MIN: CPT | Performed by: ORTHOPAEDIC SURGERY

## 2018-08-28 RX ORDER — DIAZEPAM 10 MG/1
TABLET ORAL
Qty: 2 TABLET | Refills: 0 | OUTPATIENT
Start: 2018-08-28 | End: 2018-10-24

## 2018-08-28 NOTE — PROGRESS NOTES
Janet Mejia is a 58 y.o. female   Primary provider:  Faraz Arzate MD    Chief Complaint   Patient presents with   • Left Shoulder - Pain       HISTORY OF PRESENT ILLNESS: New pt w/ complaint of left shoulder pain x's 6wks, patient states that she cant lift her arm and that it is constant pain,patient states that she has seen Dr. Arzate in [facility] for this problem, has tried ibuprofen,patient uses joint flex cream says it does help,  Patient had xrays done on 8/7/18  States that she fell about 6 months ago.  She fell at her parents house as well.  She states that about 6 weeks ago she began having difficulty moving her left arm.  She states that she just woke up one morning and was unable to move her arm.  The symptoms that she is having are not acutely related to either of her falls.  She states that now she is having pain at night that is dull ache but occasionally has sharp stabbing pain as well.  She has difficulty trying to do things that are at or above shoulder level.  Meloxicam does not help and she is currently taking ibuprofen.  No numbness or tingling.    Pain   This is a recurrent problem. The current episode started more than 1 month ago. The problem occurs constantly. The problem has been gradually worsening. Associated symptoms include chills, headaches and joint swelling. The symptoms are aggravated by standing and walking (sitting,lying down and sleeping). She has tried rest and NSAIDs for the symptoms. The treatment provided no relief.        CONCURRENT MEDICAL HISTORY:    Past Medical History:   Diagnosis Date   • Acute otitis externa    • Adhesive capsulitis of left shoulder 8/28/2018   • Amenorrhea    • Asthma    • Biliary calculus    • Essential hypertension    • Generalized anxiety disorder     History of anxiety/panic attack. Declines Paxil.     • History of venous thrombosis 2/19/2018   • Hyperlipidemia    • IFG (impaired fasting glucose) 2/19/2018   • Menopausal flushing    • Obesity      BMI 34   • On long term drug therapy    • Osteopenia of spine 2/19/2018   • Peripheral venous insufficiency    • Rotator cuff syndrome, left 8/28/2018   • RUQ pain    • Superficial thrombophlebitis     long saphenous vein - chronic right distal greater saphenous vein      • Venous thrombosis     Right superficial venous thrombus of mid/distal greater saphenous vein. On chronic Coumadin      • Vertigo    • Visit for gynecologic examination        Allergies   Allergen Reactions   • Erythromycin GI Intolerance   • Dimetapp Cold-Allergy [Brompheniramine-Phenylephrine] Rash         Current Outpatient Prescriptions:   •  aspirin 81 MG EC tablet, Take 81 mg by mouth Daily., Disp: , Rfl:   •  Calcium Carb-Cholecalciferol (CALCIUM-VITAMIN D) 500-200 MG-UNIT per tablet, Take 1 tablet by mouth Daily., Disp: 30 tablet, Rfl: 11  •  Camphor (JOINTFLEX EX), Apply 1 application topically., Disp: , Rfl:   •  meloxicam (MOBIC) 15 MG tablet, Take 1 tablet by mouth Daily As Needed (pain). Take with food, Disp: 30 tablet, Rfl: 0  •  omeprazole (PRILOSEC) 40 MG capsule, Take 1 capsule by mouth Daily. For stomach acid or reflux, Disp: 30 capsule, Rfl: 0  •  tiZANidine (ZANAFLEX) 4 MG tablet, 1/2-1 qhs prn muscle spasms, Disp: 30 tablet, Rfl: 0  •  diazePAM (VALIUM) 10 MG tablet, 30 min before procedure and one right before procedure, Disp: 2 tablet, Rfl: 0    Past Surgical History:   Procedure Laterality Date   • TONSILLECTOMY AND ADENOIDECTOMY         Family History   Problem Relation Age of Onset   • Heart disease Maternal Grandmother    • Breast cancer Maternal Grandmother    • Coronary artery disease Maternal Grandmother    • Other Father         Respiratory disorder   • Breast cancer Paternal Grandmother         Social History     Social History   • Marital status:      Spouse name: N/A   • Number of children: N/A   • Years of education: N/A     Occupational History   • Not on file.     Social History Main Topics   •  "Smoking status: Never Smoker   • Smokeless tobacco: Never Used   • Alcohol use No   • Drug use: Unknown   • Sexual activity: Defer     Other Topics Concern   • Not on file     Social History Narrative   • No narrative on file        Review of Systems   Constitutional: Positive for chills.   HENT: Positive for hearing loss.    Gastrointestinal:        Diarrhea   Genitourinary:        Hot flashes    Musculoskeletal: Positive for joint swelling.        Stiffness   Skin:        brusing easily   Neurological: Positive for dizziness and headaches.        Numbness, tingling   Psychiatric/Behavioral: Positive for sleep disturbance.        Anxiety   All other systems reviewed and are negative.      PHYSICAL EXAMINATION:       Ht 160 cm (63\")   Wt 87.5 kg (193 lb)   BMI 34.19 kg/m²     Physical Exam   Constitutional: She is oriented to person, place, and time. She appears well-developed and well-nourished.   Neurological: She is alert and oriented to person, place, and time.   Psychiatric: She has a normal mood and affect. Her behavior is normal. Judgment and thought content normal.       GAIT:     []  Normal  []  Antalgic    Assistive device: []  None  []  Walker     []  Crutches  []  Cane     []  Wheelchair  []  Stretcher    Right Shoulder Exam     Tenderness   The patient is experiencing no tenderness.        Range of Motion   The patient has normal right shoulder ROM.    Muscle Strength   The patient has normal right shoulder strength.    Tests   Hawkin's test: negative    Other   Erythema: absent  Sensation: normal  Pulse: present      Left Shoulder Exam     Tenderness   The patient is experiencing tenderness in the acromioclavicular joint and acromion.    Range of Motion   Active Abduction: 90   Passive Abduction: 120   Forward Flexion: 90 (120° passive)     Muscle Strength   Abduction: 4/5   Supraspinatus: 4/5     Tests   Cross Arm: positive  Hawkin's test: positive  Impingement: positive    Other   Erythema: " absent  Sensation: normal  Pulse: present               Three view left shoulder     HISTORY: Left shoulder pain with decreased external rotation and  flexion. Fell 5 months ago and then again four months ago.     AP films with the humerus in internal and external rotation and  tangential view were obtained.     COMPARISON: None     Calcific tendinitis or bursitis.  Minimal hypertrophic change acromioclavicular joint.   No fracture or dislocation.  No other osseous or articular abnormality.     IMPRESSION:  CONCLUSION:  Calcific tendinitis or bursitis.  Minimal hypertrophic change acromioclavicular joint.     [id number]     Electronically signed by:  Juan Alberto Chahal MD  8/7/2018 7:07 PM CDT  Workstation: [email or web address]        ASSESSMENT:    Diagnoses and all orders for this visit:    Acute pain of left shoulder  -     MRI shoulder left wo contrast; Future    Adhesive capsulitis of left shoulder    Rotator cuff syndrome, left  -     MRI shoulder left wo contrast; Future    Other orders  -     diazePAM (VALIUM) 10 MG tablet; 30 min before procedure and one right before procedure          PLAN    She will begin taking ibuprofen on a regular basis.  We discussed proceeding with an MRI of her left shoulder to assess for rotator cuff integrity.  She has had 6 weeks of pain and she has significant limitation in motion as well as with weakness and pain.  We discussed the possibility of brachial neuritis.  Pending the results of her MRI we can then determine whether surgical intervention is warranted or proceeding with conservative management.    Return for recheck for MRI results.    Fran Rico MD

## 2018-09-06 ENCOUNTER — HOSPITAL ENCOUNTER (OUTPATIENT)
Dept: MRI IMAGING | Facility: HOSPITAL | Age: 58
Discharge: HOME | End: 2018-09-06
Attending: ORTHOPAEDIC SURGERY | Admitting: ORTHOPAEDIC SURGERY

## 2018-09-06 DIAGNOSIS — M75.102 ROTATOR CUFF SYNDROME, LEFT: ICD-10-CM

## 2018-09-06 DIAGNOSIS — M25.512 ACUTE PAIN OF LEFT SHOULDER: ICD-10-CM

## 2018-09-06 PROCEDURE — 73221 MRI JOINT UPR EXTREM W/O DYE: CPT

## 2018-09-11 ENCOUNTER — OFFICE VISIT (OUTPATIENT)
Dept: ORTHOPEDIC SURGERY | Facility: CLINIC | Age: 58
End: 2018-09-11

## 2018-09-11 VITALS — HEIGHT: 63 IN | WEIGHT: 196 LBS | BODY MASS INDEX: 34.73 KG/M2

## 2018-09-11 DIAGNOSIS — M75.102 ROTATOR CUFF SYNDROME, LEFT: ICD-10-CM

## 2018-09-11 DIAGNOSIS — M75.112 PARTIAL TEAR OF LEFT ROTATOR CUFF: ICD-10-CM

## 2018-09-11 DIAGNOSIS — M75.02 ADHESIVE CAPSULITIS OF LEFT SHOULDER: Primary | ICD-10-CM

## 2018-09-11 DIAGNOSIS — M25.512 ACUTE PAIN OF LEFT SHOULDER: ICD-10-CM

## 2018-09-11 PROCEDURE — 20610 DRAIN/INJ JOINT/BURSA W/O US: CPT | Performed by: ORTHOPAEDIC SURGERY

## 2018-09-11 PROCEDURE — 99213 OFFICE O/P EST LOW 20 MIN: CPT | Performed by: ORTHOPAEDIC SURGERY

## 2018-09-11 RX ADMIN — LIDOCAINE HYDROCHLORIDE 2 ML: 20 INJECTION, SOLUTION INFILTRATION; PERINEURAL at 14:53

## 2018-09-11 RX ADMIN — TRIAMCINOLONE ACETONIDE 40 MG: 40 INJECTION, SUSPENSION INTRA-ARTICULAR; INTRAMUSCULAR at 14:53

## 2018-09-11 NOTE — PROGRESS NOTES
"Janet Mejia is a 58 y.o. female returns for     Chief Complaint   Patient presents with   • Left Shoulder - Follow-up   • Results     MRI       HISTORY OF PRESENT ILLNESS:   Patient being seen for left shoulder follow up. MRI done at . Patient would like to discuss findings. Patient states pain increases while laying down, disturbing sleep.         CONCURRENT MEDICAL HISTORY:    The following portions of the patient's history were reviewed and updated as appropriate: allergies, current medications, past family history, past medical history, past social history, past surgical history and problem list.     ROS  No fevers or chills.  No chest pain or shortness of air.  No GI or  disturbances.    PHYSICAL EXAMINATION:       Ht 160 cm (63\")   Wt 88.9 kg (196 lb)   BMI 34.72 kg/m²     Physical Exam   Constitutional: She is oriented to person, place, and time. She appears well-developed and well-nourished.   Neurological: She is alert and oriented to person, place, and time.   Psychiatric: She has a normal mood and affect. Her behavior is normal. Judgment and thought content normal.       GAIT:     [x]  Normal  []  Antalgic    Assistive device: [x]  None  []  Walker     []  Crutches  []  Cane     []  Wheelchair  []  Stretcher    Left Shoulder Exam     Tenderness   The patient is experiencing tenderness in the acromioclavicular joint and acromion.    Range of Motion   Active Abduction: 90   Passive Abduction: 100   Forward Flexion: 90     Muscle Strength   Abduction: 4/5   Supraspinatus: 4/5     Tests   Cross Arm: positive  Hawkin's test: positive  Impingement: positive    Other   Erythema: absent  Sensation: normal  Pulse: present               Mri Shoulder Left Wo Contrast    Result Date: 9/6/2018  Narrative: MRI left shoulder. HISTORY: Left shoulder pain. TECHNIQUE: Multiplanar multisequence noncontrast images left shoulder. FINDINGS: Mild acromioclavicular joint arthrosis but no significant underlying " impingement. Partial thickness bursal surface and interstitial tear supraspinatous tendon series 8 image 10 the size of the gap in AP projection 0.94 cm. The size of the gap in lateral medial projection 0.5 cm. Supraspinatous and infraspinatus tendons are otherwise unremarkable. No full-thickness tear. Normal subscapularis. Normal biceps tendon. Normal glenoid kendy. Contour deformity and subtle bone edema posterior lateral aspect of the humeral head. This suggests prior trauma, prior dislocation i.e.  Hill-Sachs deformity. Best appreciated series 6 image 12. MRI left shoulder is otherwise unremarkable.     Impression: CONCLUSION: Mild acromioclavicular arthrosis without evidence of any significant underlying impingement. Partial-thickness bursal surface and interstitial tear mid and anterior aspect of the supraspinatous tendon. Supraspinatous and infraspinatus tendons are otherwise unremarkable. No full-thickness tear. Normal subscapularis. Subtle bone edema and contour deformity posterior lateral aspect of the humeral head suggesting prior trauma, dislocation (subtle Hill-Sachs deformity. MRI left shoulder is otherwise unremarkable. Electronically signed by:  Prem De Los Santos MD  9/6/2018 4:31 PM CDT Workstation: [id number]            ASSESSMENT:    Diagnoses and all orders for this visit:    Adhesive capsulitis of left shoulder  -     Large Joint Arthrocentesis  -     Ambulatory Referral to Physical Therapy Evaluate and treat (Adhesive capsulitis,  ROM, str)    Acute pain of left shoulder  -     Large Joint Arthrocentesis  -     Ambulatory Referral to Physical Therapy Evaluate and treat (Adhesive capsulitis,  ROM, str)    Rotator cuff syndrome, left  -     Large Joint Arthrocentesis  -     Ambulatory Referral to Physical Therapy Evaluate and treat (Adhesive capsulitis,  ROM, str)    Partial tear of left rotator cuff  -     Large Joint Arthrocentesis  -     Ambulatory Referral to Physical Therapy Evaluate and treat  (Adhesive capsulitis,  ROM, str)        Large Joint Arthrocentesis  Date/Time: 9/11/2018 2:53 PM  Consent given by: patient  Site marked: site marked  Timeout: Immediately prior to procedure a time out was called to verify the correct patient, procedure, equipment, support staff and site/side marked as required   Supporting Documentation  Indications: pain   Procedure Details  Location: shoulder - L subacromial bursa  Preparation: Patient was prepped and draped in the usual sterile fashion  Needle size: 22 G  Approach: posterior  Medications administered: 40 mg triamcinolone acetonide 40 MG/ML; 2 mL lidocaine 2%  Patient tolerance: patient tolerated the procedure well with no immediate complications            PLAN    We discussed an injection into the left shoulder today.  We also discussed physical therapy for range of motion and strengthening.  We discussed the possibility of surgical intervention if her symptoms don't improve.  Slowly progress activity as tolerated.    Patient's Body mass index is 34.72 kg/m². BMI is above normal parameters. Recommendations include: exercise counseling and nutrition counseling.      Return in about 6 weeks (around 10/23/2018) for recheck.    Fran Rico MD

## 2018-09-14 RX ORDER — LIDOCAINE HYDROCHLORIDE 20 MG/ML
2 INJECTION, SOLUTION INFILTRATION; PERINEURAL
Status: COMPLETED | OUTPATIENT
Start: 2018-09-11 | End: 2018-09-11

## 2018-09-14 RX ORDER — TRIAMCINOLONE ACETONIDE 40 MG/ML
40 INJECTION, SUSPENSION INTRA-ARTICULAR; INTRAMUSCULAR
Status: COMPLETED | OUTPATIENT
Start: 2018-09-11 | End: 2018-09-11

## 2018-10-24 ENCOUNTER — OFFICE VISIT (OUTPATIENT)
Dept: ORTHOPEDIC SURGERY | Facility: CLINIC | Age: 58
End: 2018-10-24

## 2018-10-24 VITALS — WEIGHT: 191 LBS | HEIGHT: 63 IN | BODY MASS INDEX: 33.84 KG/M2

## 2018-10-24 DIAGNOSIS — M75.102 ROTATOR CUFF SYNDROME, LEFT: ICD-10-CM

## 2018-10-24 DIAGNOSIS — M25.512 ACUTE PAIN OF LEFT SHOULDER: ICD-10-CM

## 2018-10-24 DIAGNOSIS — M75.02 ADHESIVE CAPSULITIS OF LEFT SHOULDER: Primary | ICD-10-CM

## 2018-10-24 PROCEDURE — 99213 OFFICE O/P EST LOW 20 MIN: CPT | Performed by: ORTHOPAEDIC SURGERY

## 2018-10-24 NOTE — PROGRESS NOTES
"Janet Mejia is a 58 y.o. female returns for     Chief Complaint   Patient presents with   • Left Shoulder - Follow-up, Pain       HISTORY OF PRESENT ILLNESS: f/u left shoulder pain, steroid injection done on 9/11/2018. Patient states that injection did not help but therapy seems to be helping. Physical therapy at [facility] 2 days/week.   Still having pain but doing much better.   Sleeping better now as well.    No numbness or tingling.       CONCURRENT MEDICAL HISTORY:    The following portions of the patient's history were reviewed and updated as appropriate: allergies, current medications, past family history, past medical history, past social history, past surgical history and problem list.     ROS  No fevers or chills.  No chest pain or shortness of air.  No GI or  disturbances.    PHYSICAL EXAMINATION:       Ht 160 cm (63\")   Wt 86.6 kg (191 lb)   BMI 33.83 kg/m²     Physical Exam   Constitutional: She is oriented to person, place, and time. She appears well-developed and well-nourished.   Neurological: She is alert and oriented to person, place, and time.   Psychiatric: She has a normal mood and affect. Her behavior is normal. Judgment and thought content normal.       GAIT:     [x]  Normal  []  Antalgic    Assistive device: [x]  None  []  Walker     []  Crutches  []  Cane     []  Wheelchair  []  Stretcher    Left Shoulder Exam     Tenderness   The patient is experiencing no tenderness.         Range of Motion   Active Abduction: 100   Forward Flexion: 150   External Rotation: 40   Internal Rotation 90 degrees: 80     Tests   Hawkin's test: positive  Impingement: negative    Other   Erythema: absent  Scars: absent  Sensation: normal  Pulse: present                       ASSESSMENT:    Diagnoses and all orders for this visit:    Adhesive capsulitis of left shoulder    Acute pain of left shoulder    Rotator cuff syndrome, left          PLAN    Activity as tolerated  Continue with stretches and " exercises.  Discussed possible repeat steroid injection  Pain has improved.  Patient will call when symptoms worsen again.    Patient's Body mass index is 33.83 kg/m². BMI is above normal parameters. Recommendations include: exercise counseling and nutrition counseling.  Return if symptoms worsen or fail to improve, for recheck.    Fran Rico MD

## 2019-02-18 ENCOUNTER — LAB (OUTPATIENT)
Dept: LAB | Facility: OTHER | Age: 59
End: 2019-02-18

## 2019-02-18 DIAGNOSIS — R73.01 IFG (IMPAIRED FASTING GLUCOSE): ICD-10-CM

## 2019-02-18 DIAGNOSIS — E66.09 CLASS 1 OBESITY DUE TO EXCESS CALORIES WITHOUT SERIOUS COMORBIDITY WITH BODY MASS INDEX (BMI) OF 33.0 TO 33.9 IN ADULT: Chronic | ICD-10-CM

## 2019-02-18 DIAGNOSIS — E78.5 HYPERLIPIDEMIA, UNSPECIFIED HYPERLIPIDEMIA TYPE: ICD-10-CM

## 2019-02-18 DIAGNOSIS — E78.5 HYPERLIPIDEMIA, UNSPECIFIED HYPERLIPIDEMIA TYPE: Primary | Chronic | ICD-10-CM

## 2019-02-18 DIAGNOSIS — E66.09 CLASS 1 OBESITY DUE TO EXCESS CALORIES WITHOUT SERIOUS COMORBIDITY WITH BODY MASS INDEX (BMI) OF 33.0 TO 33.9 IN ADULT: ICD-10-CM

## 2019-02-18 DIAGNOSIS — R73.01 IFG (IMPAIRED FASTING GLUCOSE): Chronic | ICD-10-CM

## 2019-02-18 LAB
ALBUMIN SERPL-MCNC: 4.3 G/DL (ref 3.5–5)
ALBUMIN/GLOB SERPL: 1.5 G/DL (ref 1.1–1.8)
ALP SERPL-CCNC: 85 U/L (ref 38–126)
ALT SERPL W P-5'-P-CCNC: 21 U/L
ANION GAP SERPL CALCULATED.3IONS-SCNC: 5 MMOL/L (ref 5–15)
AST SERPL-CCNC: 27 U/L (ref 14–36)
BASOPHILS # BLD AUTO: 0.02 10*3/MM3 (ref 0–0.2)
BASOPHILS NFR BLD AUTO: 0.4 % (ref 0–2)
BILIRUB SERPL-MCNC: 0.8 MG/DL (ref 0.2–1.3)
BUN BLD-MCNC: 18 MG/DL (ref 7–17)
BUN/CREAT SERPL: 24 (ref 7–25)
CALCIUM SPEC-SCNC: 9.2 MG/DL (ref 8.4–10.2)
CHLORIDE SERPL-SCNC: 108 MMOL/L (ref 98–107)
CHOLEST SERPL-MCNC: 204 MG/DL (ref 150–200)
CO2 SERPL-SCNC: 28 MMOL/L (ref 22–30)
CREAT BLD-MCNC: 0.75 MG/DL (ref 0.52–1.04)
DEPRECATED RDW RBC AUTO: 44.6 FL (ref 36.4–46.3)
EOSINOPHIL # BLD AUTO: 0.11 10*3/MM3 (ref 0–0.7)
EOSINOPHIL NFR BLD AUTO: 2.3 % (ref 0–7)
ERYTHROCYTE [DISTWIDTH] IN BLOOD BY AUTOMATED COUNT: 13.9 % (ref 11.5–14.5)
GFR SERPL CREATININE-BSD FRML MDRD: 80 ML/MIN/1.73 (ref 51–120)
GLOBULIN UR ELPH-MCNC: 2.9 GM/DL (ref 2.3–3.5)
GLUCOSE BLD-MCNC: 105 MG/DL (ref 74–99)
HBA1C MFR BLD: 5.6 % (ref 4–5.6)
HCT VFR BLD AUTO: 43.2 % (ref 35–45)
HDLC SERPL-MCNC: 53 MG/DL (ref 40–59)
HGB BLD-MCNC: 13.6 G/DL (ref 12–15.5)
LDLC SERPL CALC-MCNC: 124 MG/DL
LDLC/HDLC SERPL: 2.35 {RATIO} (ref 0–3.22)
LYMPHOCYTES # BLD AUTO: 1.46 10*3/MM3 (ref 0.6–4.2)
LYMPHOCYTES NFR BLD AUTO: 30 % (ref 10–50)
MCH RBC QN AUTO: 28.3 PG (ref 26.5–34)
MCHC RBC AUTO-ENTMCNC: 31.5 G/DL (ref 31.4–36)
MCV RBC AUTO: 89.8 FL (ref 80–98)
MONOCYTES # BLD AUTO: 0.41 10*3/MM3 (ref 0–0.9)
MONOCYTES NFR BLD AUTO: 8.4 % (ref 0–12)
NEUTROPHILS # BLD AUTO: 2.86 10*3/MM3 (ref 2–8.6)
NEUTROPHILS NFR BLD AUTO: 58.9 % (ref 37–80)
PLATELET # BLD AUTO: 191 10*3/MM3 (ref 150–450)
PMV BLD AUTO: 9.9 FL (ref 8–12)
POTASSIUM BLD-SCNC: 4.1 MMOL/L (ref 3.4–5)
PROT SERPL-MCNC: 7.2 G/DL (ref 6.3–8.2)
RBC # BLD AUTO: 4.81 10*6/MM3 (ref 3.77–5.16)
SODIUM BLD-SCNC: 141 MMOL/L (ref 137–145)
TRIGL SERPL-MCNC: 133 MG/DL
TSH SERPL DL<=0.05 MIU/L-ACNC: 2.19 MIU/ML (ref 0.46–4.68)
VLDLC SERPL-MCNC: 26.6 MG/DL
WBC NRBC COR # BLD: 4.86 10*3/MM3 (ref 3.2–9.8)

## 2019-02-18 PROCEDURE — 84443 ASSAY THYROID STIM HORMONE: CPT | Performed by: INTERNAL MEDICINE

## 2019-02-18 PROCEDURE — 80053 COMPREHEN METABOLIC PANEL: CPT | Performed by: INTERNAL MEDICINE

## 2019-02-18 PROCEDURE — 85025 COMPLETE CBC W/AUTO DIFF WBC: CPT | Performed by: INTERNAL MEDICINE

## 2019-02-18 PROCEDURE — 83036 HEMOGLOBIN GLYCOSYLATED A1C: CPT | Performed by: INTERNAL MEDICINE

## 2019-02-18 PROCEDURE — 80061 LIPID PANEL: CPT | Performed by: INTERNAL MEDICINE

## 2019-02-25 ENCOUNTER — OFFICE VISIT (OUTPATIENT)
Dept: FAMILY MEDICINE CLINIC | Facility: CLINIC | Age: 59
End: 2019-02-25

## 2019-02-25 VITALS
TEMPERATURE: 98 F | HEART RATE: 84 BPM | WEIGHT: 198 LBS | DIASTOLIC BLOOD PRESSURE: 78 MMHG | BODY MASS INDEX: 35.08 KG/M2 | HEIGHT: 63 IN | SYSTOLIC BLOOD PRESSURE: 146 MMHG

## 2019-02-25 DIAGNOSIS — E66.09 CLASS 2 OBESITY DUE TO EXCESS CALORIES WITHOUT SERIOUS COMORBIDITY WITH BODY MASS INDEX (BMI) OF 35.0 TO 35.9 IN ADULT: Chronic | ICD-10-CM

## 2019-02-25 DIAGNOSIS — I87.2 PERIPHERAL VENOUS INSUFFICIENCY: Chronic | ICD-10-CM

## 2019-02-25 DIAGNOSIS — E78.5 HYPERLIPIDEMIA, UNSPECIFIED HYPERLIPIDEMIA TYPE: Primary | Chronic | ICD-10-CM

## 2019-02-25 DIAGNOSIS — R61 EXCESSIVE SWEATING: ICD-10-CM

## 2019-02-25 DIAGNOSIS — Z86.718 HISTORY OF VENOUS THROMBOSIS: Chronic | ICD-10-CM

## 2019-02-25 DIAGNOSIS — M75.102 ROTATOR CUFF SYNDROME OF LEFT SHOULDER: Chronic | ICD-10-CM

## 2019-02-25 DIAGNOSIS — M85.88 OSTEOPENIA OF SPINE: Chronic | ICD-10-CM

## 2019-02-25 DIAGNOSIS — R73.01 IFG (IMPAIRED FASTING GLUCOSE): Chronic | ICD-10-CM

## 2019-02-25 PROCEDURE — 99214 OFFICE O/P EST MOD 30 MIN: CPT | Performed by: INTERNAL MEDICINE

## 2020-02-09 NOTE — PROGRESS NOTES
Subjective     Janet Mejia is a 59 y.o. female.     History of Present Illness   Janet Mejia is a 58 y.o. female for annual follow up on medical issues including obesity, mild hyperlipidemia, osteopenia of the lumbar spine, osteoarthritis/DJD, mild impaired fasting glucose, and chronic venous insufficiency with history of superficial venous thrombosis of the right distal greater saphenous vein treated with six months of Coumadin with no evidence of recurrence.  She does have some mild residual lower extremity edema, for which I recommended use of compression stockings.     Dr. Rico saw her several times last year to address left rotator cuff syndrome.  MRI revealed partial undersurface tear of the supraspinatus tendon.  Steroid injection helped temporarily.  Several months of physical therapy did not seem to help, in fact she felt that it usually flared her symptoms.  She is doing some limited home exercise range of motion exercises, but has a rather severe range of motion deficit, unable to externally rotate the humerus.  Also unable to raise the arm above the level of the shoulder.  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  Is becoming extremely difficult for her to perform the activities and duties necessary to continue to work.  We discussed options.  She declines a repeat steroid injection at this time.  Dr. Rico has offered a surgical option, but she would be forced to close her business while she recovers.    She tends to sweat excessively in the armpits and bilateral inguinal skin folds.  We discussed a management strategy for this.  She has been using feminine hygiene powders in the inguinal skin folds.  She is using a liquid roll-on deodorant in the axilla.    Last years repeat DEXA revealed 11% decrease in the hip, but still normal hip density.  It revealed persistent lumbar osteopenia.  She is not taking calcium supplement due to constipation tendencies.  She  "agrees to vitamin D supplement.    Her labs are all reviewed with results listed below.  CMP and CBC are unremarkable, other than glucose still slightly elevated at 105.  A1c has increased to 5.6.  LDL is 124 with a good ratio.      Review of Systems   Constitutional: Negative for chills, fatigue and fever.   HENT: Negative for congestion, ear pain, postnasal drip, sinus pressure and sore throat.    Respiratory: Negative for cough, shortness of breath and wheezing.    Cardiovascular: Positive for leg swelling. Negative for chest pain and palpitations.   Gastrointestinal: Negative for abdominal pain, blood in stool, constipation, diarrhea, nausea and vomiting.   Endocrine: Negative for cold intolerance, heat intolerance, polydipsia and polyuria.   Genitourinary: Negative for dysuria, frequency, hematuria and urgency.   Musculoskeletal: Positive for arthralgias.        Left shoulder pain   Skin: Negative for rash.   Neurological: Negative for syncope and weakness.       Objective     /78   Pulse 84   Temp 98 °F (36.7 °C) (Oral)   Ht 160 cm (63\")   Wt 89.8 kg (198 lb)   Breastfeeding? No   BMI 35.07 kg/m²     Physical Exam   Constitutional: She is oriented to person, place, and time. She appears well-developed and well-nourished. No distress.   HENT:   Head: Normocephalic and atraumatic.   Nose: Nose normal.   Mouth/Throat: Oropharynx is clear and moist. No oropharyngeal exudate.   Eyes: EOM are normal. Pupils are equal, round, and reactive to light.   Neck: Neck supple. No JVD present. No thyromegaly present.   Cardiovascular: Normal rate, regular rhythm and normal heart sounds.   Pulmonary/Chest: Effort normal and breath sounds normal. No accessory muscle usage. No respiratory distress. She has no wheezes. She has no rales.   Abdominal: Soft. Bowel sounds are normal. She exhibits no distension. There is no tenderness.   Musculoskeletal: She exhibits no edema.   Exam of left shoulder reveals positive " [Dear  ___] : Dear  [unfilled], [Consult Letter:] : I had the pleasure of evaluating your patient, [unfilled]. supraspinatus sign.   She cannot bring left arm above level of the shoulder. External rotation is severely limited.               Vascular Status -  Right foot edema: Trace edema bilateral ankles.  Lymphadenopathy:     She has no cervical adenopathy.   Neurological: She is alert and oriented to person, place, and time. No cranial nerve deficit.   Psychiatric: She has a normal mood and affect. Her speech is normal and behavior is normal. Judgment and thought content normal.       PHQ-2/PHQ-9 Depression Screening 2/25/2019   Little interest or pleasure in doing things 0   Feeling down, depressed, or hopeless 0   Total Score 0       Assessment/Plan     Intensify lower calorie/lower carbohydrate diet exercise and weight loss efforts.    She understands how she can use compression stockings to minimize the dependent lower extremity edema aggravated by being on her feet so much during the day.    Continue the home range of motion exercises for the shoulder.  I encouraged her to get back with Dr. Rico soon to discuss other options.  This issue may ultimately disable her if not successfully addressed.    Continue the current adequate dietary calcium.  I did recommend adding vitamin D 1000 units daily.  We will check a vitamin D level with next year's labs.  We will repeat a DEXA in a year or 2.    We discussed hygiene issues and how to use stronger antiperspirant's to help with the excessive sweating in the skin folds.  Change to BAN deodorant or even use Certain Dri.  Powders and skin folds.  We discussed the benefits of weight loss.    Return to clinic in 12 months with fasting labs prior.    Diagnoses and all orders for this visit:    Hyperlipidemia, unspecified hyperlipidemia type  -     CBC Auto Differential; Future  -     Comprehensive Metabolic Panel; Future  -     Lipid Panel; Future  -     TSH; Future    IFG (impaired fasting glucose)  -     Hemoglobin A1c; Future    Class 2 obesity due to excess calories without  [Consult Closing:] : Thank you very much for allowing me to participate in the care of this patient.  If you have any questions, please do not hesitate to contact me. serious comorbidity with body mass index (BMI) of 35.0 to 35.9 in adult  -     TSH; Future    Osteopenia of spine  -     Vitamin D 25 Hydroxy; Future    History of venous thrombosis    Rotator cuff syndrome of left shoulder -followed by Dr. Rico    Excessive sweating  -     TSH; Future    Peripheral venous insufficiency        Lab on 02/18/2019   Component Date Value Ref Range Status   • Glucose 02/18/2019 105* 74 - 99 mg/dL Final   • BUN 02/18/2019 18* 7 - 17 mg/dL Final   • Creatinine 02/18/2019 0.75  0.52 - 1.04 mg/dL Final   • Sodium 02/18/2019 141  137 - 145 mmol/L Final   • Potassium 02/18/2019 4.1  3.4 - 5.0 mmol/L Final   • Chloride 02/18/2019 108* 98 - 107 mmol/L Final   • CO2 02/18/2019 28.0  22.0 - 30.0 mmol/L Final   • Calcium 02/18/2019 9.2  8.4 - 10.2 mg/dL Final   • Total Protein 02/18/2019 7.2  6.3 - 8.2 g/dL Final   • Albumin 02/18/2019 4.30  3.50 - 5.00 g/dL Final   • ALT (SGPT) 02/18/2019 21  <=35 U/L Final   • AST (SGOT) 02/18/2019 27  14 - 36 U/L Final   • Alkaline Phosphatase 02/18/2019 85  38 - 126 U/L Final   • Total Bilirubin 02/18/2019 0.8  0.2 - 1.3 mg/dL Final   • eGFR Non African Amer 02/18/2019 80  51 - 120 mL/min/1.73 Final   • Globulin 02/18/2019 2.9  2.3 - 3.5 gm/dL Final   • A/G Ratio 02/18/2019 1.5  1.1 - 1.8 g/dL Final   • BUN/Creatinine Ratio 02/18/2019 24.0  7.0 - 25.0 Final   • Anion Gap 02/18/2019 5.0  5.0 - 15.0 mmol/L Final   • Total Cholesterol 02/18/2019 204* 150 - 200 mg/dL Final   • Triglycerides 02/18/2019 133  <=150 mg/dL Final   • HDL Cholesterol 02/18/2019 53  40 - 59 mg/dL Final   • LDL Cholesterol  02/18/2019 124* <=100 mg/dL Final   • VLDL Cholesterol 02/18/2019 26.6  mg/dL Final   • LDL/HDL Ratio 02/18/2019 2.35  0.00 - 3.22 Final   • TSH 02/18/2019 2.190  0.460 - 4.680 mIU/mL Final   • Hemoglobin A1C 02/18/2019 5.6  4 - 5.6 % Final   • WBC 02/18/2019 4.86  3.20 - 9.80 10*3/mm3 Final   • RBC 02/18/2019 4.81  3.77 - 5.16 10*6/mm3 Final   • Hemoglobin 02/18/2019  13.6  12.0 - 15.5 g/dL Final   • Hematocrit 02/18/2019 43.2  35.0 - 45.0 % Final   • MCV 02/18/2019 89.8  80.0 - 98.0 fL Final   • MCH 02/18/2019 28.3  26.5 - 34.0 pg Final   • MCHC 02/18/2019 31.5  31.4 - 36.0 g/dL Final   • RDW 02/18/2019 13.9  11.5 - 14.5 % Final   • RDW-SD 02/18/2019 44.6  36.4 - 46.3 fl Final   • MPV 02/18/2019 9.9  8.0 - 12.0 fL Final   • Platelets 02/18/2019 191  150 - 450 10*3/mm3 Final   • Neutrophil % 02/18/2019 58.9  37.0 - 80.0 % Final   • Lymphocyte % 02/18/2019 30.0  10.0 - 50.0 % Final   • Monocyte % 02/18/2019 8.4  0.0 - 12.0 % Final   • Eosinophil % 02/18/2019 2.3  0.0 - 7.0 % Final   • Basophil % 02/18/2019 0.4  0.0 - 2.0 % Final   • Neutrophils, Absolute 02/18/2019 2.86  2.00 - 8.60 10*3/mm3 Final   • Lymphocytes, Absolute 02/18/2019 1.46  0.60 - 4.20 10*3/mm3 Final   • Monocytes, Absolute 02/18/2019 0.41  0.00 - 0.90 10*3/mm3 Final   • Eosinophils, Absolute 02/18/2019 0.11  0.00 - 0.70 10*3/mm3 Final   • Basophils, Absolute 02/18/2019 0.02  0.00 - 0.20 10*3/mm3 Final   ]     [Please see my note below.] : Please see my note below. [Sincerely,] : Sincerely, [DrAngel  ___] : Dr. BERG [DrAngel ___] : Dr. BERG [FreeTextEntry3] : Keturah Sneed MD\par Attending Physician\par Cancer Services\par [facility]\par

## 2020-02-24 ENCOUNTER — LAB (OUTPATIENT)
Dept: LAB | Facility: OTHER | Age: 60
End: 2020-02-24

## 2020-02-24 DIAGNOSIS — E66.09 CLASS 2 OBESITY DUE TO EXCESS CALORIES WITHOUT SERIOUS COMORBIDITY WITH BODY MASS INDEX (BMI) OF 35.0 TO 35.9 IN ADULT: Chronic | ICD-10-CM

## 2020-02-24 DIAGNOSIS — R73.01 IFG (IMPAIRED FASTING GLUCOSE): Chronic | ICD-10-CM

## 2020-02-24 DIAGNOSIS — E78.5 HYPERLIPIDEMIA, UNSPECIFIED HYPERLIPIDEMIA TYPE: Chronic | ICD-10-CM

## 2020-02-24 DIAGNOSIS — R61 EXCESSIVE SWEATING: ICD-10-CM

## 2020-02-24 DIAGNOSIS — M85.88 OSTEOPENIA OF SPINE: Chronic | ICD-10-CM

## 2020-02-24 LAB
25(OH)D3 SERPL-MCNC: 13.1 NG/ML (ref 30–100)
ALBUMIN SERPL-MCNC: 4.3 G/DL (ref 3.5–5)
ALBUMIN/GLOB SERPL: 1.3 G/DL (ref 1.1–1.8)
ALP SERPL-CCNC: 82 U/L (ref 38–126)
ALT SERPL W P-5'-P-CCNC: 19 U/L
ANION GAP SERPL CALCULATED.3IONS-SCNC: 9 MMOL/L (ref 5–15)
AST SERPL-CCNC: 27 U/L (ref 14–36)
BASOPHILS # BLD AUTO: 0.02 10*3/MM3 (ref 0–0.2)
BASOPHILS NFR BLD AUTO: 0.4 % (ref 0–1.5)
BILIRUB SERPL-MCNC: 0.6 MG/DL (ref 0.2–1.3)
BUN BLD-MCNC: 19 MG/DL (ref 7–23)
BUN/CREAT SERPL: 29.7 (ref 7–25)
CALCIUM SPEC-SCNC: 9.3 MG/DL (ref 8.4–10.2)
CHLORIDE SERPL-SCNC: 108 MMOL/L (ref 101–112)
CHOLEST SERPL-MCNC: 230 MG/DL (ref 150–200)
CO2 SERPL-SCNC: 25 MMOL/L (ref 22–30)
CREAT BLD-MCNC: 0.64 MG/DL (ref 0.52–1.04)
DEPRECATED RDW RBC AUTO: 44.4 FL (ref 37–54)
EOSINOPHIL # BLD AUTO: 0.13 10*3/MM3 (ref 0–0.4)
EOSINOPHIL NFR BLD AUTO: 2.7 % (ref 0.3–6.2)
ERYTHROCYTE [DISTWIDTH] IN BLOOD BY AUTOMATED COUNT: 14 % (ref 12.3–15.4)
GFR SERPL CREATININE-BSD FRML MDRD: 95 ML/MIN/1.73 (ref 51–120)
GLOBULIN UR ELPH-MCNC: 3.4 GM/DL (ref 2.3–3.5)
GLUCOSE BLD-MCNC: 107 MG/DL (ref 70–99)
HBA1C MFR BLD: 5.54 % (ref 4.8–5.6)
HCT VFR BLD AUTO: 43.4 % (ref 34–46.6)
HDLC SERPL-MCNC: 55 MG/DL (ref 40–59)
HGB BLD-MCNC: 14.1 G/DL (ref 12–15.9)
LDLC SERPL CALC-MCNC: 150 MG/DL
LDLC/HDLC SERPL: 2.73 {RATIO} (ref 0–3.22)
LYMPHOCYTES # BLD AUTO: 1.42 10*3/MM3 (ref 0.7–3.1)
LYMPHOCYTES NFR BLD AUTO: 29.5 % (ref 19.6–45.3)
MCH RBC QN AUTO: 28.9 PG (ref 26.6–33)
MCHC RBC AUTO-ENTMCNC: 32.5 G/DL (ref 31.5–35.7)
MCV RBC AUTO: 88.9 FL (ref 79–97)
MONOCYTES # BLD AUTO: 0.36 10*3/MM3 (ref 0.1–0.9)
MONOCYTES NFR BLD AUTO: 7.5 % (ref 5–12)
NEUTROPHILS # BLD AUTO: 2.88 10*3/MM3 (ref 1.7–7)
NEUTROPHILS NFR BLD AUTO: 59.9 % (ref 42.7–76)
PLATELET # BLD AUTO: 202 10*3/MM3 (ref 140–450)
PMV BLD AUTO: 10 FL (ref 6–12)
POTASSIUM BLD-SCNC: 4.1 MMOL/L (ref 3.4–5)
PROT SERPL-MCNC: 7.7 G/DL (ref 6.3–8.6)
RBC # BLD AUTO: 4.88 10*6/MM3 (ref 3.77–5.28)
SODIUM BLD-SCNC: 142 MMOL/L (ref 137–145)
TRIGL SERPL-MCNC: 123 MG/DL
TSH SERPL DL<=0.05 MIU/L-ACNC: 2.98 UIU/ML (ref 0.27–4.2)
VLDLC SERPL-MCNC: 24.6 MG/DL
WBC NRBC COR # BLD: 4.81 10*3/MM3 (ref 3.4–10.8)

## 2020-02-24 PROCEDURE — 82306 VITAMIN D 25 HYDROXY: CPT | Performed by: INTERNAL MEDICINE

## 2020-02-24 PROCEDURE — 80053 COMPREHEN METABOLIC PANEL: CPT | Performed by: INTERNAL MEDICINE

## 2020-02-24 PROCEDURE — 85025 COMPLETE CBC W/AUTO DIFF WBC: CPT | Performed by: INTERNAL MEDICINE

## 2020-02-24 PROCEDURE — 84443 ASSAY THYROID STIM HORMONE: CPT | Performed by: INTERNAL MEDICINE

## 2020-02-24 PROCEDURE — 83036 HEMOGLOBIN GLYCOSYLATED A1C: CPT | Performed by: INTERNAL MEDICINE

## 2020-02-24 PROCEDURE — 80061 LIPID PANEL: CPT | Performed by: INTERNAL MEDICINE

## 2020-02-28 DIAGNOSIS — N95.1 POST MENOPAUSAL SYNDROME: Primary | ICD-10-CM

## 2020-03-02 ENCOUNTER — OFFICE VISIT (OUTPATIENT)
Dept: FAMILY MEDICINE CLINIC | Facility: CLINIC | Age: 60
End: 2020-03-02

## 2020-03-02 VITALS
DIASTOLIC BLOOD PRESSURE: 80 MMHG | BODY MASS INDEX: 35.37 KG/M2 | HEART RATE: 68 BPM | WEIGHT: 199.6 LBS | HEIGHT: 63 IN | TEMPERATURE: 97.3 F | SYSTOLIC BLOOD PRESSURE: 146 MMHG

## 2020-03-02 DIAGNOSIS — E78.5 HYPERLIPIDEMIA, UNSPECIFIED HYPERLIPIDEMIA TYPE: Primary | Chronic | ICD-10-CM

## 2020-03-02 DIAGNOSIS — M85.88 OSTEOPENIA OF SPINE: Chronic | ICD-10-CM

## 2020-03-02 DIAGNOSIS — E66.09 CLASS 2 OBESITY DUE TO EXCESS CALORIES WITHOUT SERIOUS COMORBIDITY WITH BODY MASS INDEX (BMI) OF 35.0 TO 35.9 IN ADULT: Chronic | ICD-10-CM

## 2020-03-02 DIAGNOSIS — E55.9 VITAMIN D DEFICIENCY: ICD-10-CM

## 2020-03-02 DIAGNOSIS — I87.2 PERIPHERAL VENOUS INSUFFICIENCY: Chronic | ICD-10-CM

## 2020-03-02 DIAGNOSIS — Z86.718 HISTORY OF VENOUS THROMBOSIS: Chronic | ICD-10-CM

## 2020-03-02 DIAGNOSIS — R73.01 IFG (IMPAIRED FASTING GLUCOSE): Chronic | ICD-10-CM

## 2020-03-02 PROCEDURE — 99214 OFFICE O/P EST MOD 30 MIN: CPT | Performed by: INTERNAL MEDICINE

## 2020-03-02 NOTE — PATIENT INSTRUCTIONS
Exercising to Lose Weight  Exercise is structured, repetitive physical activity to improve fitness and health. Getting regular exercise is important for everyone. It is especially important if you are overweight. Being overweight increases your risk of heart disease, stroke, diabetes, high blood pressure, and several types of cancer. Reducing your calorie intake and exercising can help you lose weight.  Exercise is usually categorized as moderate or vigorous intensity. To lose weight, most people need to do a certain amount of moderate-intensity or vigorous-intensity exercise each week.  Moderate-intensity exercise    Moderate-intensity exercise is any activity that gets you moving enough to burn at least three times more energy (calories) than if you were sitting.  Examples of moderate exercise include:  · Walking a mile in 15 minutes.  · Doing light yard work.  · Biking at an easy pace.  Most people should get at least 150 minutes (2 hours and 30 minutes) a week of moderate-intensity exercise to maintain their body weight.  Vigorous-intensity exercise  Vigorous-intensity exercise is any activity that gets you moving enough to burn at least six times more calories than if you were sitting. When you exercise at this intensity, you should be working hard enough that you are not able to carry on a conversation.  Examples of vigorous exercise include:  · Running.  · Playing a team sport, such as football, basketball, and soccer.  · Jumping rope.  Most people should get at least 75 minutes (1 hour and 15 minutes) a week of vigorous-intensity exercise to maintain their body weight.  How can exercise affect me?  When you exercise enough to burn more calories than you eat, you lose weight. Exercise also reduces body fat and builds muscle. The more muscle you have, the more calories you burn. Exercise also:  · Improves mood.  · Reduces stress and tension.  · Improves your overall fitness, flexibility, and  endurance.  · Increases bone strength.  The amount of exercise you need to lose weight depends on:  · Your age.  · The type of exercise.  · Any health conditions you have.  · Your overall physical ability.  Talk to your health care provider about how much exercise you need and what types of activities are safe for you.  What actions can I take to lose weight?  Nutrition    · Make changes to your diet as told by your health care provider or diet and nutrition specialist (dietitian). This may include:  ? Eating fewer calories.  ? Eating more protein.  ? Eating less unhealthy fats.  ? Eating a diet that includes fresh fruits and vegetables, whole grains, low-fat dairy products, and lean protein.  ? Avoiding foods with added fat, salt, and sugar.  · Drink plenty of water while you exercise to prevent dehydration or heat stroke.  Activity  · Choose an activity that you enjoy and set realistic goals. Your health care provider can help you make an exercise plan that works for you.  · Exercise at a moderate or vigorous intensity most days of the week.  ? The intensity of exercise may vary from person to person. You can tell how intense a workout is for you by paying attention to your breathing and heartbeat. Most people will notice their breathing and heartbeat get faster with more intense exercise.  · Do resistance training twice each week, such as:  ? Push-ups.  ? Sit-ups.  ? Lifting weights.  ? Using resistance bands.  · Getting short amounts of exercise can be just as helpful as long structured periods of exercise. If you have trouble finding time to exercise, try to include exercise in your daily routine.  ? Get up, stretch, and walk around every 30 minutes throughout the day.  ? Go for a walk during your lunch break.  ? Park your car farther away from your destination.  ? If you take public transportation, get off one stop early and walk the rest of the way.  ? Make phone calls while standing up and walking  around.  ? Take the stairs instead of elevators or escalators.  · Wear comfortable clothes and shoes with good support.  · Do not exercise so much that you hurt yourself, feel dizzy, or get very short of breath.  Where to find more information  · [place] Department of Health and Human Services: www.hhs.gov  · Centers for Disease Control and Prevention (CDC): www.cdc.gov  Contact a health care provider:  · Before starting a new exercise program.  · If you have questions or concerns about your weight.  · If you have a medical problem that keeps you from exercising.  Get help right away if you have any of the following while exercising:  · Injury.  · Dizziness.  · Difficulty breathing or shortness of breath that does not go away when you stop exercising.  · Chest pain.  · Rapid heartbeat.  Summary  · Being overweight increases your risk of heart disease, stroke, diabetes, high blood pressure, and several types of cancer.  · Losing weight happens when you burn more calories than you eat.  · Reducing the amount of calories you eat in addition to getting regular moderate or vigorous exercise each week helps you lose weight.  This information is not intended to replace advice given to you by your health care provider. Make sure you discuss any questions you have with your health care provider.  Document Released: 01/20/2012 Document Revised: 12/31/2018 Document Reviewed: 12/31/2018  [employer] Interactive Patient Education © 2020 [employer]      Calorie Counting for Weight Loss  Calories are units of energy. Your body needs a certain amount of calories from food to keep you going throughout the day. When you eat more calories than your body needs, your body stores the extra calories as fat. When you eat fewer calories than your body needs, your body burns fat to get the energy it needs.  Calorie counting means keeping track of how many calories you eat and drink each day. Calorie counting can be helpful if you need to lose  weight. If you make sure to eat fewer calories than your body needs, you should lose weight. Ask your health care provider what a healthy weight is for you.  For calorie counting to work, you will need to eat the right number of calories in a day in order to lose a healthy amount of weight per week. A dietitian can help you determine how many calories you need in a day and will give you suggestions on how to reach your calorie goal.  · A healthy amount of weight to lose per week is usually 1-2 lb (0.5-0.9 kg). This usually means that your daily calorie intake should be reduced by 500-750 calories.  · Eating 1,200 - 1,500 calories per day can help most women lose weight.  · Eating 1,500 - 1,800 calories per day can help most men lose weight.  What is my plan?  My goal is to have __________ calories per day.  If I have this many calories per day, I should lose around __________ pounds per week.  What do I need to know about calorie counting?  In order to meet your daily calorie goal, you will need to:  · Find out how many calories are in each food you would like to eat. Try to do this before you eat.  · Decide how much of the food you plan to eat.  · Write down what you ate and how many calories it had. Doing this is called keeping a food log.  To successfully lose weight, it is important to balance calorie counting with a healthy lifestyle that includes regular activity. Aim for 150 minutes of moderate exercise (such as walking) or 75 minutes of vigorous exercise (such as running) each week.  Where do I find calorie information?    The number of calories in a food can be found on a Nutrition Facts label. If a food does not have a Nutrition Facts label, try to look up the calories online or ask your dietitian for help.  Remember that calories are listed per serving. If you choose to have more than one serving of a food, you will have to multiply the calories per serving by the amount of servings you plan to eat. For  "example, the label on a package of bread might say that a serving size is 1 slice and that there are 90 calories in a serving. If you eat 1 slice, you will have eaten 90 calories. If you eat 2 slices, you will have eaten 180 calories.  How do I keep a food log?  Immediately after each meal, record the following information in your food log:  · What you ate. Don't forget to include toppings, sauces, and other extras on the food.  · How much you ate. This can be measured in cups, ounces, or number of items.  · How many calories each food and drink had.  · The total number of calories in the meal.  Keep your food log near you, such as in a small notebook in your pocket, or use a mobile luzmaria or website. Some programs will calculate calories for you and show you how many calories you have left for the day to meet your goal.  What are some calorie counting tips?    · Use your calories on foods and drinks that will fill you up and not leave you hungry:  ? Some examples of foods that fill you up are nuts and nut butters, vegetables, lean proteins, and high-fiber foods like whole grains. High-fiber foods are foods with more than 5 g fiber per serving.  ? Drinks such as sodas, specialty coffee drinks, alcohol, and juices have a lot of calories, yet do not fill you up.  · Eat nutritious foods and avoid empty calories. Empty calories are calories you get from foods or beverages that do not have many vitamins or protein, such as candy, sweets, and soda. It is better to have a nutritious high-calorie food (such as an avocado) than a food with few nutrients (such as a bag of chips).  · Know how many calories are in the foods you eat most often. This will help you calculate calorie counts faster.  · Pay attention to calories in drinks. Low-calorie drinks include water and unsweetened drinks.  · Pay attention to nutrition labels for \"low fat\" or \"fat free\" foods. These foods sometimes have the same amount of calories or more calories " than the full fat versions. They also often have added sugar, starch, or salt, to make up for flavor that was removed with the fat.  · Find a way of tracking calories that works for you. Get creative. Try different apps or programs if writing down calories does not work for you.  What are some portion control tips?  · Know how many calories are in a serving. This will help you know how many servings of a certain food you can have.  · Use a measuring cup to measure serving sizes. You could also try weighing out portions on a kitchen scale. With time, you will be able to estimate serving sizes for some foods.  · Take some time to put servings of different foods on your favorite plates, bowls, and cups so you know what a serving looks like.  · Try not to eat straight from a bag or box. Doing this can lead to overeating. Put the amount you would like to eat in a cup or on a plate to make sure you are eating the right portion.  · Use smaller plates, glasses, and bowls to prevent overeating.  · Try not to multitask (for example, watch TV or use your computer) while eating. If it is time to eat, sit down at a table and enjoy your food. This will help you to know when you are full. It will also help you to be aware of what you are eating and how much you are eating.  What are tips for following this plan?  Reading food labels  · Check the calorie count compared to the serving size. The serving size may be smaller than what you are used to eating.  · Check the source of the calories. Make sure the food you are eating is high in vitamins and protein and low in saturated and trans fats.  Shopping  · Read nutrition labels while you shop. This will help you make healthy decisions before you decide to purchase your food.  · Make a grocery list and stick to it.  Cooking  · Try to cook your favorite foods in a healthier way. For example, try baking instead of frying.  · Use low-fat dairy products.  Meal planning  · Use more fruits  "and vegetables. Half of your plate should be fruits and vegetables.  · Include lean proteins like poultry and fish.  How do I count calories when eating out?  · Ask for smaller portion sizes.  · Consider sharing an entree and sides instead of getting your own entree.  · If you get your own entree, eat only half. Ask for a box at the beginning of your meal and put the rest of your entree in it so you are not tempted to eat it.  · If calories are listed on the menu, choose the lower calorie options.  · Choose dishes that include vegetables, fruits, whole grains, low-fat dairy products, and lean protein.  · Choose items that are boiled, broiled, grilled, or steamed. Stay away from items that are buttered, battered, fried, or served with cream sauce. Items labeled \"crispy\" are usually fried, unless stated otherwise.  · Choose water, low-fat milk, unsweetened iced tea, or other drinks without added sugar. If you want an alcoholic beverage, choose a lower calorie option such as a glass of wine or light beer.  · Ask for dressings, sauces, and syrups on the side. These are usually high in calories, so you should limit the amount you eat.  · If you want a salad, choose a garden salad and ask for grilled meats. Avoid extra toppings like klein, cheese, or fried items. Ask for the dressing on the side, or ask for olive oil and vinegar or lemon to use as dressing.  · Estimate how many servings of a food you are given. For example, a serving of cooked rice is ½ cup or about the size of half a baseball. Knowing serving sizes will help you be aware of how much food you are eating at restaurants. The list below tells you how big or small some common portion sizes are based on everyday objects:  ? 1 oz--4 stacked dice.  ? 3 oz--1 deck of cards.  ? 1 tsp--1 die.  ? 1 Tbsp--½ a ping-pong ball.  ? 2 Tbsp--1 ping-pong ball.  ? ½ cup--½ baseball.  ? 1 cup--1 baseball.  Summary  · Calorie counting means keeping track of how many calories " you eat and drink each day. If you eat fewer calories than your body needs, you should lose weight.  · A healthy amount of weight to lose per week is usually 1-2 lb (0.5-0.9 kg). This usually means reducing your daily calorie intake by 500-750 calories.  · The number of calories in a food can be found on a Nutrition Facts label. If a food does not have a Nutrition Facts label, try to look up the calories online or ask your dietitian for help.  · Use your calories on foods and drinks that will fill you up, and not on foods and drinks that will leave you hungry.  · Use smaller plates, glasses, and bowls to prevent overeating.  This information is not intended to replace advice given to you by your health care provider. Make sure you discuss any questions you have with your health care provider.  Document Released: 12/18/2006 Document Revised: 09/06/2019 Document Reviewed: 11/17/2017  [employer] Interactive Patient Education © 2020 [employer]

## 2020-03-02 NOTE — PROGRESS NOTES
Subjective     History of Present Illness     Janet Mejia is a 60 y.o. female who comes in for annual follow up on medical issues including obesity, mild hyperlipidemia, osteopenia of the lumbar spine, osteoarthritis/DJD, mild impaired fasting glucose, and chronic venous insufficiency with history of superficial venous thrombosis of the right distal greater saphenous vein treated with six months of Coumadin with no evidence of recurrence.  I had her taking daily aspirin, but she has discontinued it.  She is thinking about trying the compression stockings with the toes cut out.  She does a lot of weightbearing with her work as a  for 40 years and continues to work three days weekly.    She is scheduled for repeat DEXA later this afternoon.  DEXA 02/2018 revealed 11% decrease in the hip, but still normal hip density and persistent lumbar osteopenia.  Labs reveal vitamin D deficiency with vitamin D 13.1.     She is up to date on colonoscopy and mammogram.  She declines influenza vaccine.        Labs continue to reveal impaired fasting glucose.  She is drinking about one sugar sweetened soft drink daily.  Her cholesterol is also above goal.  Weight is up 1 pound in the past year.  She does admit to high-carbohydrate foods, especially eating pizza frequently.   We reviewed dietary principles to help delay progression of impaired fasting glucose and also lower cholesterol.        Blood pressure at goal.      The patient's relevant past medical, surgical, and social history was reviewed in Epic.   Lab results are reviewed with the patient today.  CBC unremarkable. Fasting glucose 107. A1c 5.54.  Normal renal and liver function.  Total cholesterol 230. HDL 55.  .  Triglycerides 123.  LDL/HDL ratio 2.73.       Review of Systems   Constitutional: Negative for chills, fatigue and fever.   HENT: Negative for congestion, ear pain, postnasal drip, sinus pressure and sore throat.    Respiratory: Negative for  "cough, shortness of breath and wheezing.    Cardiovascular: Negative for chest pain, palpitations and leg swelling.   Gastrointestinal: Negative for abdominal pain, blood in stool, constipation, diarrhea, nausea and vomiting.   Endocrine: Negative for cold intolerance, heat intolerance, polydipsia and polyuria.   Genitourinary: Negative for dysuria, frequency, hematuria and urgency.   Skin: Negative for rash.   Neurological: Negative for syncope and weakness.        Objective     Visit Vitals  /80   Pulse 68   Temp 97.3 °F (36.3 °C) (Oral)   Ht 160 cm (63\")   Wt 90.5 kg (199 lb 9.6 oz)   Breastfeeding No   BMI 35.36 kg/m²     Physical Exam   Constitutional: She is oriented to person, place, and time. She appears well-developed and well-nourished. No distress.   Obese female.    HENT:   Head: Normocephalic and atraumatic.   Nose: Right sinus exhibits no maxillary sinus tenderness and no frontal sinus tenderness. Left sinus exhibits no maxillary sinus tenderness and no frontal sinus tenderness.   Mouth/Throat: Uvula is midline, oropharynx is clear and moist and mucous membranes are normal. No oral lesions. No tonsillar exudate.   Eyes: Pupils are equal, round, and reactive to light. Conjunctivae and EOM are normal.   Neck: Trachea normal. Neck supple. No JVD present. Carotid bruit is not present. No tracheal deviation present. No thyroid mass and no thyromegaly present.   Cardiovascular: Normal rate, regular rhythm, normal heart sounds and intact distal pulses.  No extrasystoles are present. PMI is not displaced.   No murmur heard.  Pulmonary/Chest: Effort normal and breath sounds normal. No accessory muscle usage. No respiratory distress. She has no decreased breath sounds. She has no wheezes. She has no rhonchi. She has no rales.   Abdominal: Soft. Bowel sounds are normal. She exhibits no distension. There is no hepatosplenomegaly. There is no tenderness.   Obese abdomen.      Vascular Status -  Her right foot " exhibits normal foot vasculature  and no edema (Varicose veins and mild chronic venous insufficiency today.). Her left foot exhibits normal foot vasculature  and no edema.  Lymphadenopathy:     She has no cervical adenopathy.   Neurological: She is alert and oriented to person, place, and time. No cranial nerve deficit. Coordination normal.   Skin: Skin is warm, dry and intact. No rash noted. No cyanosis. Nails show no clubbing.   Psychiatric: She has a normal mood and affect. Her speech is normal and behavior is normal. Judgment and thought content normal.   Vitals reviewed.    Future Appointments   Date Time Provider Department Center   3/2/2020  2:00 PM [facility] DEXA 1 [facility]A [facility] [place]   3/8/2021  1:00 PM Faraz Arzate MD [facility] None         Assessment/Plan      She is going to have repeat DEXA when she leaves here today.   We will notify patient with results.  Continue the good dietary calcium intake.  Pursue daily weightbearing exercises.      For the new problem of vitamin D deficiency, I recommended vitamin D 2000 IU daily.  She does not get much sun exposure due to her fair complexion.       She declines influenza vaccine.      I recommended use of compression stockings to manage chronic venous insufficiency aggravated by being on her feet so much during the day working as a .  Pursue weight loss.      Recommended low-fat, low cholesterol, low carbohydrate diet, exercise and weight loss to help delay progression of impaired fasting and lower cholesterol.      Return to clinic in 12 months with fasting labs prior.      Scribed for Dr. Arzate by Haley Sheriff, [facility].     Diagnoses and all orders for this visit:    Hyperlipidemia, unspecified hyperlipidemia type  -     CBC Auto Differential; Future  -     Comprehensive Metabolic Panel; Future  -     Lipid Panel; Future  -     TSH; Future    Peripheral venous insufficiency    Class 2 obesity due to excess calories without serious  comorbidity with body mass index (BMI) of 35.0 to 35.9 in adult    IFG (impaired fasting glucose)  -     Hemoglobin A1c; Future    Osteopenia of spine  -     Vitamin D 25 Hydroxy; Future    History of venous thrombosis    Vitamin D deficiency  -     Vitamin D 25 Hydroxy; Future        Lab on 02/24/2020   Component Date Value Ref Range Status   • WBC 02/24/2020 4.81  3.40 - 10.80 10*3/mm3 Final   • RBC 02/24/2020 4.88  3.77 - 5.28 10*6/mm3 Final   • Hemoglobin 02/24/2020 14.1  12.0 - 15.9 g/dL Final   • Hematocrit 02/24/2020 43.4  34.0 - 46.6 % Final   • MCV 02/24/2020 88.9  79.0 - 97.0 fL Final   • MCH 02/24/2020 28.9  26.6 - 33.0 pg Final   • MCHC 02/24/2020 32.5  31.5 - 35.7 g/dL Final   • RDW 02/24/2020 14.0  12.3 - 15.4 % Final   • RDW-SD 02/24/2020 44.4  37.0 - 54.0 fl Final   • MPV 02/24/2020 10.0  6.0 - 12.0 fL Final   • Platelets 02/24/2020 202  140 - 450 10*3/mm3 Final   • Neutrophil % 02/24/2020 59.9  42.7 - 76.0 % Final   • Lymphocyte % 02/24/2020 29.5  19.6 - 45.3 % Final   • Monocyte % 02/24/2020 7.5  5.0 - 12.0 % Final   • Eosinophil % 02/24/2020 2.7  0.3 - 6.2 % Final   • Basophil % 02/24/2020 0.4  0.0 - 1.5 % Final   • Neutrophils, Absolute 02/24/2020 2.88  1.70 - 7.00 10*3/mm3 Final   • Lymphocytes, Absolute 02/24/2020 1.42  0.70 - 3.10 10*3/mm3 Final   • Monocytes, Absolute 02/24/2020 0.36  0.10 - 0.90 10*3/mm3 Final   • Eosinophils, Absolute 02/24/2020 0.13  0.00 - 0.40 10*3/mm3 Final   • Basophils, Absolute 02/24/2020 0.02  0.00 - 0.20 10*3/mm3 Final   • Glucose 02/24/2020 107* 70 - 99 mg/dL Final   • BUN 02/24/2020 19  7 - 23 mg/dL Final   • Creatinine 02/24/2020 0.64  0.52 - 1.04 mg/dL Final   • Sodium 02/24/2020 142  137 - 145 mmol/L Final   • Potassium 02/24/2020 4.1  3.4 - 5.0 mmol/L Final   • Chloride 02/24/2020 108  101 - 112 mmol/L Final   • CO2 02/24/2020 25.0  22.0 - 30.0 mmol/L Final   • Calcium 02/24/2020 9.3  8.4 - 10.2 mg/dL Final   • Total Protein 02/24/2020 7.7  6.3 - 8.6 g/dL  Final   • Albumin 02/24/2020 4.30  3.50 - 5.00 g/dL Final   • ALT (SGPT) 02/24/2020 19  <=35 U/L Final   • AST (SGOT) 02/24/2020 27  14 - 36 U/L Final   • Alkaline Phosphatase 02/24/2020 82  38 - 126 U/L Final   • Total Bilirubin 02/24/2020 0.6  0.2 - 1.3 mg/dL Final   • eGFR Non African Amer 02/24/2020 95  51 - 120 mL/min/1.73 Final   • Globulin 02/24/2020 3.4  2.3 - 3.5 gm/dL Final   • A/G Ratio 02/24/2020 1.3  1.1 - 1.8 g/dL Final   • BUN/Creatinine Ratio 02/24/2020 29.7* 7.0 - 25.0 Final   • Anion Gap 02/24/2020 9.0  5.0 - 15.0 mmol/L Final   • Hemoglobin A1C 02/24/2020 5.54  4.80 - 5.60 % Final   • Total Cholesterol 02/24/2020 230* 150 - 200 mg/dL Final   • Triglycerides 02/24/2020 123  <=150 mg/dL Final   • HDL Cholesterol 02/24/2020 55  40 - 59 mg/dL Final   • LDL Cholesterol  02/24/2020 150* <=100 mg/dL Final   • VLDL Cholesterol 02/24/2020 24.6  mg/dL Final   • LDL/HDL Ratio 02/24/2020 2.73  0.00 - 3.22 Final   • 25 Hydroxy, Vitamin D 02/24/2020 13.1* 30.0 - 100.0 ng/ml Final   • TSH 02/24/2020 2.980  0.270 - 4.200 uIU/mL Final   ]

## 2021-03-01 ENCOUNTER — LAB (OUTPATIENT)
Dept: LAB | Facility: OTHER | Age: 61
End: 2021-03-01

## 2021-03-01 DIAGNOSIS — M85.88 OSTEOPENIA OF SPINE: Chronic | ICD-10-CM

## 2021-03-01 DIAGNOSIS — E78.5 HYPERLIPIDEMIA, UNSPECIFIED HYPERLIPIDEMIA TYPE: Chronic | ICD-10-CM

## 2021-03-01 DIAGNOSIS — R73.01 IFG (IMPAIRED FASTING GLUCOSE): Chronic | ICD-10-CM

## 2021-03-01 DIAGNOSIS — E55.9 VITAMIN D DEFICIENCY: ICD-10-CM

## 2021-03-01 LAB
25(OH)D3 SERPL-MCNC: 16.5 NG/ML
ALBUMIN SERPL-MCNC: 4.2 G/DL (ref 3.5–5)
ALBUMIN/GLOB SERPL: 1.6 G/DL (ref 1.1–1.8)
ALP SERPL-CCNC: 80 U/L (ref 38–126)
ALT SERPL W P-5'-P-CCNC: 20 U/L
ANION GAP SERPL CALCULATED.3IONS-SCNC: 8 MMOL/L (ref 5–15)
AST SERPL-CCNC: 28 U/L (ref 14–36)
BASOPHILS # BLD AUTO: 0.04 10*3/MM3 (ref 0–0.2)
BASOPHILS NFR BLD AUTO: 0.9 % (ref 0–1.5)
BILIRUB SERPL-MCNC: 0.6 MG/DL (ref 0.2–1.3)
BUN SERPL-MCNC: 16 MG/DL (ref 7–23)
BUN/CREAT SERPL: 20.8 (ref 7–25)
CALCIUM SPEC-SCNC: 9.2 MG/DL (ref 8.4–10.2)
CHLORIDE SERPL-SCNC: 106 MMOL/L (ref 101–112)
CHOLEST SERPL-MCNC: 215 MG/DL (ref 150–200)
CO2 SERPL-SCNC: 26 MMOL/L (ref 22–30)
CREAT SERPL-MCNC: 0.77 MG/DL (ref 0.52–1.04)
DEPRECATED RDW RBC AUTO: 44 FL (ref 37–54)
EOSINOPHIL # BLD AUTO: 0.11 10*3/MM3 (ref 0–0.4)
EOSINOPHIL NFR BLD AUTO: 2.5 % (ref 0.3–6.2)
ERYTHROCYTE [DISTWIDTH] IN BLOOD BY AUTOMATED COUNT: 13.6 % (ref 12.3–15.4)
GFR SERPL CREATININE-BSD FRML MDRD: 77 ML/MIN/1.73 (ref 51–120)
GLOBULIN UR ELPH-MCNC: 2.7 GM/DL (ref 2.3–3.5)
GLUCOSE SERPL-MCNC: 111 MG/DL (ref 70–99)
HBA1C MFR BLD: 5.3 % (ref 4.8–5.6)
HCT VFR BLD AUTO: 40.4 % (ref 34–46.6)
HDLC SERPL-MCNC: 49 MG/DL (ref 40–59)
HGB BLD-MCNC: 13.3 G/DL (ref 12–15.9)
LDLC SERPL CALC-MCNC: 138 MG/DL
LDLC/HDLC SERPL: 2.76 {RATIO} (ref 0–3.22)
LYMPHOCYTES # BLD AUTO: 1.42 10*3/MM3 (ref 0.7–3.1)
LYMPHOCYTES NFR BLD AUTO: 32.6 % (ref 19.6–45.3)
MCH RBC QN AUTO: 29.6 PG (ref 26.6–33)
MCHC RBC AUTO-ENTMCNC: 32.9 G/DL (ref 31.5–35.7)
MCV RBC AUTO: 90 FL (ref 79–97)
MONOCYTES # BLD AUTO: 0.37 10*3/MM3 (ref 0.1–0.9)
MONOCYTES NFR BLD AUTO: 8.5 % (ref 5–12)
NEUTROPHILS NFR BLD AUTO: 2.41 10*3/MM3 (ref 1.7–7)
NEUTROPHILS NFR BLD AUTO: 55.5 % (ref 42.7–76)
PLATELET # BLD AUTO: 187 10*3/MM3 (ref 140–450)
PMV BLD AUTO: 9.8 FL (ref 6–12)
POTASSIUM SERPL-SCNC: 3.9 MMOL/L (ref 3.4–5)
PROT SERPL-MCNC: 6.9 G/DL (ref 6.3–8.6)
RBC # BLD AUTO: 4.49 10*6/MM3 (ref 3.77–5.28)
SODIUM SERPL-SCNC: 140 MMOL/L (ref 137–145)
TRIGL SERPL-MCNC: 154 MG/DL
TSH SERPL DL<=0.05 MIU/L-ACNC: 2.34 UIU/ML (ref 0.27–4.2)
VLDLC SERPL-MCNC: 28 MG/DL (ref 5–40)
WBC # BLD AUTO: 4.35 10*3/MM3 (ref 3.4–10.8)

## 2021-03-01 PROCEDURE — 80053 COMPREHEN METABOLIC PANEL: CPT | Performed by: INTERNAL MEDICINE

## 2021-03-01 PROCEDURE — 82306 VITAMIN D 25 HYDROXY: CPT | Performed by: INTERNAL MEDICINE

## 2021-03-01 PROCEDURE — 80061 LIPID PANEL: CPT | Performed by: INTERNAL MEDICINE

## 2021-03-01 PROCEDURE — 84443 ASSAY THYROID STIM HORMONE: CPT | Performed by: INTERNAL MEDICINE

## 2021-03-01 PROCEDURE — 83036 HEMOGLOBIN GLYCOSYLATED A1C: CPT | Performed by: INTERNAL MEDICINE

## 2021-03-01 PROCEDURE — 85025 COMPLETE CBC W/AUTO DIFF WBC: CPT | Performed by: INTERNAL MEDICINE

## 2021-03-08 ENCOUNTER — OFFICE VISIT (OUTPATIENT)
Dept: FAMILY MEDICINE CLINIC | Facility: CLINIC | Age: 61
End: 2021-03-08

## 2021-03-08 VITALS
DIASTOLIC BLOOD PRESSURE: 72 MMHG | BODY MASS INDEX: 35.26 KG/M2 | SYSTOLIC BLOOD PRESSURE: 118 MMHG | WEIGHT: 199 LBS | HEIGHT: 63 IN | HEART RATE: 72 BPM

## 2021-03-08 DIAGNOSIS — E78.2 MIXED HYPERLIPIDEMIA: Primary | Chronic | ICD-10-CM

## 2021-03-08 DIAGNOSIS — M85.88 OSTEOPENIA OF SPINE: Chronic | ICD-10-CM

## 2021-03-08 DIAGNOSIS — E55.9 VITAMIN D DEFICIENCY: Chronic | ICD-10-CM

## 2021-03-08 DIAGNOSIS — R73.01 IFG (IMPAIRED FASTING GLUCOSE): Chronic | ICD-10-CM

## 2021-03-08 DIAGNOSIS — E66.09 CLASS 2 OBESITY DUE TO EXCESS CALORIES WITHOUT SERIOUS COMORBIDITY WITH BODY MASS INDEX (BMI) OF 35.0 TO 35.9 IN ADULT: Chronic | ICD-10-CM

## 2021-03-08 PROCEDURE — 99443 PR PHYS/QHP TELEPHONE EVALUATION 21-30 MIN: CPT | Performed by: INTERNAL MEDICINE

## 2021-03-08 RX ORDER — ERGOCALCIFEROL 1.25 MG/1
50000 CAPSULE ORAL
Qty: 12 CAPSULE | Refills: 1 | Status: SHIPPED | OUTPATIENT
Start: 2021-03-08 | End: 2023-03-21 | Stop reason: SINTOL

## 2021-03-08 NOTE — PATIENT INSTRUCTIONS
Exercising to Lose Weight  Exercise is structured, repetitive physical activity to improve fitness and health. Getting regular exercise is important for everyone. It is especially important if you are overweight. Being overweight increases your risk of heart disease, stroke, diabetes, high blood pressure, and several types of cancer. Reducing your calorie intake and exercising can help you lose weight.  Exercise is usually categorized as moderate or vigorous intensity. To lose weight, most people need to do a certain amount of moderate-intensity or vigorous-intensity exercise each week.  Moderate-intensity exercise    Moderate-intensity exercise is any activity that gets you moving enough to burn at least three times more energy (calories) than if you were sitting.  Examples of moderate exercise include:  · Walking a mile in 15 minutes.  · Doing light yard work.  · Biking at an easy pace.  Most people should get at least 150 minutes (2 hours and 30 minutes) a week of moderate-intensity exercise to maintain their body weight.  Vigorous-intensity exercise  Vigorous-intensity exercise is any activity that gets you moving enough to burn at least six times more calories than if you were sitting. When you exercise at this intensity, you should be working hard enough that you are not able to carry on a conversation.  Examples of vigorous exercise include:  · Running.  · Playing a team sport, such as football, basketball, and soccer.  · Jumping rope.  Most people should get at least 75 minutes (1 hour and 15 minutes) a week of vigorous-intensity exercise to maintain their body weight.  How can exercise affect me?  When you exercise enough to burn more calories than you eat, you lose weight. Exercise also reduces body fat and builds muscle. The more muscle you have, the more calories you burn. Exercise also:  · Improves mood.  · Reduces stress and tension.  · Improves your overall fitness, flexibility, and  endurance.  · Increases bone strength.  The amount of exercise you need to lose weight depends on:  · Your age.  · The type of exercise.  · Any health conditions you have.  · Your overall physical ability.  Talk to your health care provider about how much exercise you need and what types of activities are safe for you.  What actions can I take to lose weight?  Nutrition    · Make changes to your diet as told by your health care provider or diet and nutrition specialist (dietitian). This may include:  ? Eating fewer calories.  ? Eating more protein.  ? Eating less unhealthy fats.  ? Eating a diet that includes fresh fruits and vegetables, whole grains, low-fat dairy products, and lean protein.  ? Avoiding foods with added fat, salt, and sugar.  · Drink plenty of water while you exercise to prevent dehydration or heat stroke.  Activity  · Choose an activity that you enjoy and set realistic goals. Your health care provider can help you make an exercise plan that works for you.  · Exercise at a moderate or vigorous intensity most days of the week.  ? The intensity of exercise may vary from person to person. You can tell how intense a workout is for you by paying attention to your breathing and heartbeat. Most people will notice their breathing and heartbeat get faster with more intense exercise.  · Do resistance training twice each week, such as:  ? Push-ups.  ? Sit-ups.  ? Lifting weights.  ? Using resistance bands.  · Getting short amounts of exercise can be just as helpful as long structured periods of exercise. If you have trouble finding time to exercise, try to include exercise in your daily routine.  ? Get up, stretch, and walk around every 30 minutes throughout the day.  ? Go for a walk during your lunch break.  ? Park your car farther away from your destination.  ? If you take public transportation, get off one stop early and walk the rest of the way.  ? Make phone calls while standing up and walking  around.  ? Take the stairs instead of elevators or escalators.  · Wear comfortable clothes and shoes with good support.  · Do not exercise so much that you hurt yourself, feel dizzy, or get very short of breath.  Where to find more information  · [place] Department of Health and Human Services: www.hhs.gov  · Centers for Disease Control and Prevention (CDC): www.cdc.gov  Contact a health care provider:  · Before starting a new exercise program.  · If you have questions or concerns about your weight.  · If you have a medical problem that keeps you from exercising.  Get help right away if you have any of the following while exercising:  · Injury.  · Dizziness.  · Difficulty breathing or shortness of breath that does not go away when you stop exercising.  · Chest pain.  · Rapid heartbeat.  Summary  · Being overweight increases your risk of heart disease, stroke, diabetes, high blood pressure, and several types of cancer.  · Losing weight happens when you burn more calories than you eat.  · Reducing the amount of calories you eat in addition to getting regular moderate or vigorous exercise each week helps you lose weight.  This information is not intended to replace advice given to you by your health care provider. Make sure you discuss any questions you have with your health care provider.  Document Revised: 12/31/2018 Document Reviewed: 12/31/2018  Elsevier Patient Education © 2020 [employer]      Calorie Counting for Weight Loss  Calories are units of energy. Your body needs a certain amount of calories from food to keep you going throughout the day. When you eat more calories than your body needs, your body stores the extra calories as fat. When you eat fewer calories than your body needs, your body burns fat to get the energy it needs.  Calorie counting means keeping track of how many calories you eat and drink each day. Calorie counting can be helpful if you need to lose weight. If you make sure to eat fewer  calories than your body needs, you should lose weight. Ask your health care provider what a healthy weight is for you.  For calorie counting to work, you will need to eat the right number of calories in a day in order to lose a healthy amount of weight per week. A dietitian can help you determine how many calories you need in a day and will give you suggestions on how to reach your calorie goal.  · A healthy amount of weight to lose per week is usually 1-2 lb (0.5-0.9 kg). This usually means that your daily calorie intake should be reduced by 500-750 calories.  · Eating 1,200 - 1,500 calories per day can help most women lose weight.  · Eating 1,500 - 1,800 calories per day can help most men lose weight.  What is my plan?  My goal is to have __________ calories per day.  If I have this many calories per day, I should lose around __________ pounds per week.  What do I need to know about calorie counting?  In order to meet your daily calorie goal, you will need to:  · Find out how many calories are in each food you would like to eat. Try to do this before you eat.  · Decide how much of the food you plan to eat.  · Write down what you ate and how many calories it had. Doing this is called keeping a food log.  To successfully lose weight, it is important to balance calorie counting with a healthy lifestyle that includes regular activity. Aim for 150 minutes of moderate exercise (such as walking) or 75 minutes of vigorous exercise (such as running) each week.  Where do I find calorie information?    The number of calories in a food can be found on a Nutrition Facts label. If a food does not have a Nutrition Facts label, try to look up the calories online or ask your dietitian for help.  Remember that calories are listed per serving. If you choose to have more than one serving of a food, you will have to multiply the calories per serving by the amount of servings you plan to eat. For example, the label on a package of  "bread might say that a serving size is 1 slice and that there are 90 calories in a serving. If you eat 1 slice, you will have eaten 90 calories. If you eat 2 slices, you will have eaten 180 calories.  How do I keep a food log?  Immediately after each meal, record the following information in your food log:  · What you ate. Don't forget to include toppings, sauces, and other extras on the food.  · How much you ate. This can be measured in cups, ounces, or number of items.  · How many calories each food and drink had.  · The total number of calories in the meal.  Keep your food log near you, such as in a small notebook in your pocket, or use a mobile luzmaria or website. Some programs will calculate calories for you and show you how many calories you have left for the day to meet your goal.  What are some calorie counting tips?    · Use your calories on foods and drinks that will fill you up and not leave you hungry:  ? Some examples of foods that fill you up are nuts and nut butters, vegetables, lean proteins, and high-fiber foods like whole grains. High-fiber foods are foods with more than 5 g fiber per serving.  ? Drinks such as sodas, specialty coffee drinks, alcohol, and juices have a lot of calories, yet do not fill you up.  · Eat nutritious foods and avoid empty calories. Empty calories are calories you get from foods or beverages that do not have many vitamins or protein, such as candy, sweets, and soda. It is better to have a nutritious high-calorie food (such as an avocado) than a food with few nutrients (such as a bag of chips).  · Know how many calories are in the foods you eat most often. This will help you calculate calorie counts faster.  · Pay attention to calories in drinks. Low-calorie drinks include water and unsweetened drinks.  · Pay attention to nutrition labels for \"low fat\" or \"fat free\" foods. These foods sometimes have the same amount of calories or more calories than the full fat versions. They " also often have added sugar, starch, or salt, to make up for flavor that was removed with the fat.  · Find a way of tracking calories that works for you. Get creative. Try different apps or programs if writing down calories does not work for you.  What are some portion control tips?  · Know how many calories are in a serving. This will help you know how many servings of a certain food you can have.  · Use a measuring cup to measure serving sizes. You could also try weighing out portions on a kitchen scale. With time, you will be able to estimate serving sizes for some foods.  · Take some time to put servings of different foods on your favorite plates, bowls, and cups so you know what a serving looks like.  · Try not to eat straight from a bag or box. Doing this can lead to overeating. Put the amount you would like to eat in a cup or on a plate to make sure you are eating the right portion.  · Use smaller plates, glasses, and bowls to prevent overeating.  · Try not to multitask (for example, watch TV or use your computer) while eating. If it is time to eat, sit down at a table and enjoy your food. This will help you to know when you are full. It will also help you to be aware of what you are eating and how much you are eating.  What are tips for following this plan?  Reading food labels  · Check the calorie count compared to the serving size. The serving size may be smaller than what you are used to eating.  · Check the source of the calories. Make sure the food you are eating is high in vitamins and protein and low in saturated and trans fats.  Shopping  · Read nutrition labels while you shop. This will help you make healthy decisions before you decide to purchase your food.  · Make a grocery list and stick to it.  Cooking  · Try to cook your favorite foods in a healthier way. For example, try baking instead of frying.  · Use low-fat dairy products.  Meal planning  · Use more fruits and vegetables. Half of your  "plate should be fruits and vegetables.  · Include lean proteins like poultry and fish.  How do I count calories when eating out?  · Ask for smaller portion sizes.  · Consider sharing an entree and sides instead of getting your own entree.  · If you get your own entree, eat only half. Ask for a box at the beginning of your meal and put the rest of your entree in it so you are not tempted to eat it.  · If calories are listed on the menu, choose the lower calorie options.  · Choose dishes that include vegetables, fruits, whole grains, low-fat dairy products, and lean protein.  · Choose items that are boiled, broiled, grilled, or steamed. Stay away from items that are buttered, battered, fried, or served with cream sauce. Items labeled \"crispy\" are usually fried, unless stated otherwise.  · Choose water, low-fat milk, unsweetened iced tea, or other drinks without added sugar. If you want an alcoholic beverage, choose a lower calorie option such as a glass of wine or light beer.  · Ask for dressings, sauces, and syrups on the side. These are usually high in calories, so you should limit the amount you eat.  · If you want a salad, choose a garden salad and ask for grilled meats. Avoid extra toppings like klein, cheese, or fried items. Ask for the dressing on the side, or ask for olive oil and vinegar or lemon to use as dressing.  · Estimate how many servings of a food you are given. For example, a serving of cooked rice is ½ cup or about the size of half a baseball. Knowing serving sizes will help you be aware of how much food you are eating at restaurants. The list below tells you how big or small some common portion sizes are based on everyday objects:  ? 1 oz--4 stacked dice.  ? 3 oz--1 deck of cards.  ? 1 tsp--1 die.  ? 1 Tbsp--½ a ping-pong ball.  ? 2 Tbsp--1 ping-pong ball.  ? ½ cup--½ baseball.  ? 1 cup--1 baseball.  Summary  · Calorie counting means keeping track of how many calories you eat and drink each day. If " you eat fewer calories than your body needs, you should lose weight.  · A healthy amount of weight to lose per week is usually 1-2 lb (0.5-0.9 kg). This usually means reducing your daily calorie intake by 500-750 calories.  · The number of calories in a food can be found on a Nutrition Facts label. If a food does not have a Nutrition Facts label, try to look up the calories online or ask your dietitian for help.  · Use your calories on foods and drinks that will fill you up, and not on foods and drinks that will leave you hungry.  · Use smaller plates, glasses, and bowls to prevent overeating.  This information is not intended to replace advice given to you by your health care provider. Make sure you discuss any questions you have with your health care provider.  Document Revised: 09/06/2019 Document Reviewed: 11/17/2017  Elsevier Patient Education © 2020 [employer]

## 2021-03-08 NOTE — PROGRESS NOTES
You have chosen to receive care through a telephone visit. Do you consent to use a telephone visit for your medical care today? Yes    Total visit time: 31 minutes.       Chief Complaint  Annual Exam    Subjective          History of Present Illness     Janet Mejia receives care via telephone visit through [facility] for annual follow up on medical issues including obesity, mild hyperlipidemia, osteopenia of the lumbar spine, osteoarthritis/DJD, mild impaired fasting glucose, and chronic venous insufficiency with history of superficial venous thrombosis of the right distal greater saphenous vein treated with six months of Coumadin with no evidence of recurrence.  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  Fortunately, she was able to draw unemployment for a few months. Despite the life changes, she feels she is adequately coping.        She is up to date on GYN visits and mammograms ordered by [facility].    DEXA 03/2020 revealed persistent osteopenia of the spine with density decreased 5% prior 2 years.  Last year, I recommended vitamin D 2000 IU daily. However, she states oral vitamin D caused constipation.  She tried liquid vitamin D, but has also gotten off the liquid supplement. Without any supplemental vitamin D, her level is extremely low at 16.5.      Labs continue to reveal impaired fasting glucose, which has not progressed.  However, she has been drinking about one sugar sweetened beverage daily.  Her weight remains above goal at 199 giving her a BMI 35.3.     The patient's relevant past medical, surgical, and social history was reviewed in Epic.   Lab results are reviewed with the patient today.  CBC unremarkable.  Fasting glucose 111.  A1c 5.3.  Normal renal and liver function.  LDL above goal at 138.  Normal thyroid screen.        Objective   Vital Signs:   /72   Pulse  "72   Ht 160 cm (63\")   Wt 90.3 kg (199 lb)   BMI 35.25 kg/m²       Physical Exam   Result Review :     CMP    CMP 3/1/21   Glucose 111 (A)   BUN 16   Creatinine 0.77   eGFR Non African Am 77   Sodium 140   Potassium 3.9   Chloride 106   Calcium 9.2   Albumin 4.20   Total Bilirubin 0.6   Alkaline Phosphatase 80   AST (SGOT) 28   ALT (SGPT) 20   (A) Abnormal value            CBC w/diff    CBC w/Diff 3/1/21   WBC 4.35   RBC 4.49   Hemoglobin 13.3   Hematocrit 40.4   MCV 90.0   MCH 29.6   MCHC 32.9   RDW 13.6   Platelets 187   Neutrophil Rel % 55.5   Lymphocyte Rel % 32.6   Monocyte Rel % 8.5   Eosinophil Rel % 2.5   Basophil Rel % 0.9           Lipid Panel    Lipid Panel 3/1/21   Total Cholesterol 215 (A)   Triglycerides 154 (A)   HDL Cholesterol 49   VLDL Cholesterol 28   LDL Cholesterol  138 (A)   LDL/HDL Ratio 2.76   (A) Abnormal value            TSH    TSH 3/1/21   TSH 2.340           A1C Last 3 Results    HGBA1C Last 3 Results 3/1/21   Hemoglobin A1C 5.30           Data reviewed: Radiologic studies DEXA 03/2020          Assessment and Plan    Diagnoses and all orders for this visit:    1. Mixed hyperlipidemia (Primary)  -     CBC Auto Differential; Future  -     Comprehensive Metabolic Panel; Future  -     Lipid Panel; Future  -     TSH; Future    2. IFG (impaired fasting glucose)  -     Hemoglobin A1c; Future    3. Class 2 obesity due to excess calories without serious comorbidity with body mass index (BMI) of 35.0 to 35.9 in adult  -     Lipid Panel; Future  -     TSH; Future    4. Osteopenia of spine  -     Vitamin D 25 Hydroxy; Future    5. Vitamin D deficiency  -     Vitamin D 25 Hydroxy; Future    Other orders  -     vitamin D (ERGOCALCIFEROL) 1.25 MG (60662 UT) capsule capsule; Take 1 capsule by mouth Every 30 (Thirty) Days.  Dispense: 12 capsule; Refill: 1           I sent a prescription for vitamin D 50,000 units daily.  Patient could not tolerate oral vitamin D due to constipation.  Notify me if she " has tolerability issues.   Maintain adequate dietary calcium intake and pursue weightbearing exercises.  She will be due repeat DEXA 03/2022.      Recommended low-fat, low cholesterol, low carbohydrate diet, exercise and weight loss to help delay progression of impaired fasting and lower cholesterol.  Eliminate the sugar sweetened soft drinks.  I recommended walking for exercise with the warmer spring weather.         Return to clinic in 12 months with fasting labs prior.      Scribed for Dr. Arzate by [facility].      Follow Up   Return in about 1 year (around 3/8/2022) for Next scheduled follow up.  Patient was given instructions and counseling regarding her condition or for health maintenance advice. Please see specific information pulled into the AVS if appropriate.

## 2022-01-14 DIAGNOSIS — E66.09 CLASS 2 OBESITY DUE TO EXCESS CALORIES WITHOUT SERIOUS COMORBIDITY WITH BODY MASS INDEX (BMI) OF 35.0 TO 35.9 IN ADULT: ICD-10-CM

## 2022-01-14 DIAGNOSIS — E78.2 MIXED HYPERLIPIDEMIA: Primary | ICD-10-CM

## 2022-01-14 DIAGNOSIS — E55.9 VITAMIN D DEFICIENCY: ICD-10-CM

## 2022-01-14 DIAGNOSIS — M85.88 OSTEOPENIA OF SPINE: ICD-10-CM

## 2022-01-14 DIAGNOSIS — R73.01 IFG (IMPAIRED FASTING GLUCOSE): ICD-10-CM

## 2022-02-09 DIAGNOSIS — N95.1 POST MENOPAUSAL SYNDROME: Primary | ICD-10-CM

## 2022-03-11 ENCOUNTER — LAB (OUTPATIENT)
Dept: LAB | Facility: OTHER | Age: 62
End: 2022-03-11

## 2022-03-11 DIAGNOSIS — E66.09 CLASS 2 OBESITY DUE TO EXCESS CALORIES WITHOUT SERIOUS COMORBIDITY WITH BODY MASS INDEX (BMI) OF 35.0 TO 35.9 IN ADULT: ICD-10-CM

## 2022-03-11 DIAGNOSIS — E55.9 VITAMIN D DEFICIENCY: ICD-10-CM

## 2022-03-11 DIAGNOSIS — E78.2 MIXED HYPERLIPIDEMIA: ICD-10-CM

## 2022-03-11 DIAGNOSIS — R73.01 IFG (IMPAIRED FASTING GLUCOSE): ICD-10-CM

## 2022-03-11 DIAGNOSIS — M85.88 OSTEOPENIA OF SPINE: ICD-10-CM

## 2022-03-11 LAB
ALBUMIN SERPL-MCNC: 4.1 G/DL (ref 3.5–5)
ALBUMIN/GLOB SERPL: 1.4 G/DL (ref 1.1–1.8)
ALP SERPL-CCNC: 88 U/L (ref 38–126)
ALT SERPL W P-5'-P-CCNC: 19 U/L
ANION GAP SERPL CALCULATED.3IONS-SCNC: 5 MMOL/L (ref 5–15)
AST SERPL-CCNC: 28 U/L (ref 14–36)
BASOPHILS # BLD AUTO: 0.04 10*3/MM3 (ref 0–0.2)
BASOPHILS NFR BLD AUTO: 0.8 % (ref 0–1.5)
BILIRUB SERPL-MCNC: 0.6 MG/DL (ref 0.2–1.3)
BUN SERPL-MCNC: 18 MG/DL (ref 7–23)
BUN/CREAT SERPL: 26.5 (ref 7–25)
CALCIUM SPEC-SCNC: 8.8 MG/DL (ref 8.4–10.2)
CHLORIDE SERPL-SCNC: 107 MMOL/L (ref 101–112)
CHOLEST SERPL-MCNC: 197 MG/DL (ref 150–200)
CO2 SERPL-SCNC: 30 MMOL/L (ref 22–30)
CREAT SERPL-MCNC: 0.68 MG/DL (ref 0.52–1.04)
DEPRECATED RDW RBC AUTO: 46.4 FL (ref 37–54)
EGFRCR SERPLBLD CKD-EPI 2021: 99.8 ML/MIN/1.73
EOSINOPHIL # BLD AUTO: 0.14 10*3/MM3 (ref 0–0.4)
EOSINOPHIL NFR BLD AUTO: 2.8 % (ref 0.3–6.2)
ERYTHROCYTE [DISTWIDTH] IN BLOOD BY AUTOMATED COUNT: 14.2 % (ref 12.3–15.4)
GLOBULIN UR ELPH-MCNC: 2.9 GM/DL (ref 2.3–3.5)
GLUCOSE SERPL-MCNC: 107 MG/DL (ref 70–99)
HBA1C MFR BLD: 5.4 % (ref 4.8–5.6)
HCT VFR BLD AUTO: 41.3 % (ref 34–46.6)
HDLC SERPL-MCNC: 52 MG/DL (ref 40–59)
HGB BLD-MCNC: 13.4 G/DL (ref 12–15.9)
LDLC SERPL CALC-MCNC: 124 MG/DL
LDLC/HDLC SERPL: 2.33 {RATIO} (ref 0–3.22)
LYMPHOCYTES # BLD AUTO: 1.55 10*3/MM3 (ref 0.7–3.1)
LYMPHOCYTES NFR BLD AUTO: 31.4 % (ref 19.6–45.3)
MCH RBC QN AUTO: 29.7 PG (ref 26.6–33)
MCHC RBC AUTO-ENTMCNC: 32.4 G/DL (ref 31.5–35.7)
MCV RBC AUTO: 91.6 FL (ref 79–97)
MONOCYTES # BLD AUTO: 0.44 10*3/MM3 (ref 0.1–0.9)
MONOCYTES NFR BLD AUTO: 8.9 % (ref 5–12)
NEUTROPHILS NFR BLD AUTO: 2.77 10*3/MM3 (ref 1.7–7)
NEUTROPHILS NFR BLD AUTO: 56.1 % (ref 42.7–76)
PLATELET # BLD AUTO: 188 10*3/MM3 (ref 140–450)
PMV BLD AUTO: 10 FL (ref 6–12)
POTASSIUM SERPL-SCNC: 4.1 MMOL/L (ref 3.4–5)
PROT SERPL-MCNC: 7 G/DL (ref 6.3–8.6)
RBC # BLD AUTO: 4.51 10*6/MM3 (ref 3.77–5.28)
SODIUM SERPL-SCNC: 142 MMOL/L (ref 137–145)
TRIGL SERPL-MCNC: 119 MG/DL
VLDLC SERPL-MCNC: 21 MG/DL (ref 5–40)
WBC NRBC COR # BLD: 4.94 10*3/MM3 (ref 3.4–10.8)

## 2022-03-11 PROCEDURE — 83036 HEMOGLOBIN GLYCOSYLATED A1C: CPT | Performed by: INTERNAL MEDICINE

## 2022-03-11 PROCEDURE — 82306 VITAMIN D 25 HYDROXY: CPT | Performed by: INTERNAL MEDICINE

## 2022-03-11 PROCEDURE — 85025 COMPLETE CBC W/AUTO DIFF WBC: CPT | Performed by: INTERNAL MEDICINE

## 2022-03-11 PROCEDURE — 80053 COMPREHEN METABOLIC PANEL: CPT | Performed by: INTERNAL MEDICINE

## 2022-03-11 PROCEDURE — 80061 LIPID PANEL: CPT | Performed by: INTERNAL MEDICINE

## 2022-03-11 PROCEDURE — 84443 ASSAY THYROID STIM HORMONE: CPT | Performed by: INTERNAL MEDICINE

## 2022-03-12 LAB
25(OH)D3 SERPL-MCNC: 17 NG/ML (ref 30–100)
TSH SERPL DL<=0.05 MIU/L-ACNC: 2.45 UIU/ML (ref 0.27–4.2)

## 2022-03-14 ENCOUNTER — OFFICE VISIT (OUTPATIENT)
Dept: FAMILY MEDICINE CLINIC | Facility: CLINIC | Age: 62
End: 2022-03-14

## 2022-03-14 VITALS
DIASTOLIC BLOOD PRESSURE: 82 MMHG | WEIGHT: 195.4 LBS | SYSTOLIC BLOOD PRESSURE: 148 MMHG | HEART RATE: 84 BPM | HEIGHT: 63 IN | TEMPERATURE: 98 F | BODY MASS INDEX: 34.62 KG/M2

## 2022-03-14 DIAGNOSIS — Z11.59 ENCOUNTER FOR HEPATITIS C SCREENING TEST FOR LOW RISK PATIENT: ICD-10-CM

## 2022-03-14 DIAGNOSIS — M85.88 OSTEOPENIA OF SPINE: Chronic | ICD-10-CM

## 2022-03-14 DIAGNOSIS — R73.01 IFG (IMPAIRED FASTING GLUCOSE): Chronic | ICD-10-CM

## 2022-03-14 DIAGNOSIS — F41.1 GENERALIZED ANXIETY DISORDER: Chronic | ICD-10-CM

## 2022-03-14 DIAGNOSIS — E66.09 CLASS 2 OBESITY DUE TO EXCESS CALORIES WITHOUT SERIOUS COMORBIDITY WITH BODY MASS INDEX (BMI) OF 35.0 TO 35.9 IN ADULT: Chronic | ICD-10-CM

## 2022-03-14 DIAGNOSIS — E55.9 VITAMIN D DEFICIENCY: Chronic | ICD-10-CM

## 2022-03-14 DIAGNOSIS — E78.2 MIXED HYPERLIPIDEMIA: Primary | Chronic | ICD-10-CM

## 2022-03-14 DIAGNOSIS — R03.0 WHITE COAT SYNDROME WITH HIGH BLOOD PRESSURE BUT WITHOUT HYPERTENSION: Chronic | ICD-10-CM

## 2022-03-14 PROCEDURE — 99214 OFFICE O/P EST MOD 30 MIN: CPT | Performed by: INTERNAL MEDICINE

## 2022-03-14 NOTE — PROGRESS NOTES
Chief Complaint  Annual Exam    Subjective          History of Present Illness     Janet Mejia presents to the office for annual follow up on medical issues including obesity, mild hyperlipidemia, osteopenia of the lumbar spine, osteoarthritis/DJD, mild impaired fasting glucose, and chronic venous insufficiency with history of superficial venous thrombosis of the right distal greater saphenous vein treated with six months of Coumadin with no evidence of recurrence.    Weight is down 4 pounds in the past year.  She reports she was able to get down 12 pounds with walking and dietary efforts, but gained a few pounds back over the winter/holiday months.   Labs reveal persistent impaired fasting glucose.     BP above goal in the office today at 148/82.  She reports BP is normally elevated with doctor visits and systolic has consistently remained in the 120 range with diastolic in the 70s with home monitoring. .      Labs reveal vitamin D deficiency.  Last year, I sent a prescription for weekly vitamin D, which she took for a while and also tried OTC oral vitamin D, both of which resulted in GI upset and she has been off of all vitamin D supplementation. She has been able to manage constipation with Colace.   Patient had DEXA 03/11/2022 revealing decline in lumbar density consistent with osteopenia of the lumbar spine with improved density of the hip.  I explained the importance of improving her vitamin D level.  She is in agreement to restart the oral vitamin D and also notify us if she cannot tolerate the vitamin D.         She declines influenza vaccines and COVID booster.  She did get two doses of COVID Pfizer vaccine with the 2nd dose 08/12/2021.        The patient's relevant past medical, surgical, and social history was reviewed in Epic.   Lab results are reviewed with the patient today.  CBC unremarkable. Fasting glucose 107,  A1c 5.4. Normal renal and liver function.  LDL above goal at 124, although, HDL of 52  "gives her acceptable ratio of 2.3.       Objective   Vital Signs:   /82 Comment: states normal at home  Pulse 84   Temp 98 °F (36.7 °C) (Tympanic)   Ht 158.8 cm (62.5\")   Wt 88.6 kg (195 lb 6.4 oz)   BMI 35.17 kg/m²       Physical Exam  Vitals reviewed.   Constitutional:       General: She is not in acute distress.     Appearance: Normal appearance. She is well-developed. She is obese.      Comments: Very pleasant female, anxious.     HENT:      Head: Normocephalic and atraumatic.      Nose:      Right Sinus: No maxillary sinus tenderness or frontal sinus tenderness.      Left Sinus: No maxillary sinus tenderness or frontal sinus tenderness.      Mouth/Throat:      Mouth: No oral lesions.      Pharynx: Uvula midline.      Tonsils: No tonsillar exudate.   Eyes:      Conjunctiva/sclera: Conjunctivae normal.      Pupils: Pupils are equal, round, and reactive to light.   Neck:      Thyroid: No thyroid mass or thyromegaly.      Vascular: No carotid bruit or JVD.      Trachea: Trachea normal. No tracheal deviation.   Cardiovascular:      Rate and Rhythm: Normal rate and regular rhythm.  No extrasystoles are present.     Chest Wall: PMI is not displaced.      Heart sounds: Normal heart sounds. No murmur heard.  Pulmonary:      Effort: Pulmonary effort is normal. No accessory muscle usage or respiratory distress.      Breath sounds: Normal breath sounds. No decreased breath sounds, wheezing, rhonchi or rales.   Abdominal:      General: Bowel sounds are normal. There is no distension.      Palpations: Abdomen is soft.      Tenderness: There is no abdominal tenderness.      Comments: Obese abdomen limits exam.    Musculoskeletal:      Cervical back: Neck supple.   Lymphadenopathy:      Cervical: No cervical adenopathy.   Skin:     General: Skin is warm and dry.      Findings: No rash.      Nails: There is no clubbing.      Comments: Impressive skin flushing of neck   Neurological:      General: No focal deficit " present.      Mental Status: She is alert and oriented to person, place, and time. Mental status is at baseline.      Cranial Nerves: No cranial nerve deficit.      Coordination: Coordination normal.   Psychiatric:         Speech: Speech normal.         Behavior: Behavior normal.         Thought Content: Thought content normal.         Judgment: Judgment normal.      Comments: Anxious            Result Review :     CMP    CMP 3/11/22   Glucose 107 (A)   BUN 18   Creatinine 0.68   Sodium 142   Potassium 4.1   Chloride 107   Calcium 8.8   Albumin 4.10   Total Bilirubin 0.6   Alkaline Phosphatase 88   AST (SGOT) 28   ALT (SGPT) 19   (A) Abnormal value            CBC w/diff    CBC w/Diff 3/11/22   WBC 4.94   RBC 4.51   Hemoglobin 13.4   Hematocrit 41.3   MCV 91.6   MCH 29.7   MCHC 32.4   RDW 14.2   Platelets 188   Neutrophil Rel % 56.1   Lymphocyte Rel % 31.4   Monocyte Rel % 8.9   Eosinophil Rel % 2.8   Basophil Rel % 0.8           Lipid Panel    Lipid Panel 3/11/22   Total Cholesterol 197   Triglycerides 119   HDL Cholesterol 52   VLDL Cholesterol 21   LDL Cholesterol  124 (A)   LDL/HDL Ratio 2.33   (A) Abnormal value            TSH    TSH 3/11/22   TSH 2.450           A1C Last 3 Results    HGBA1C Last 3 Results 3/11/22   Hemoglobin A1C 5.40           Data reviewed: Radiologic studies  DEXA 03/11/2022           Assessment and Plan    Diagnoses and all orders for this visit:    1. Mixed hyperlipidemia (Primary)  -     CBC Auto Differential; Future  -     Comprehensive Metabolic Panel; Future  -     Lipid Panel; Future  -     TSH; Future    2. IFG (impaired fasting glucose)  -     Comprehensive Metabolic Panel; Future  -     Hemoglobin A1c; Future    3. Class 2 obesity due to excess calories without serious comorbidity with body mass index (BMI) of 35.0 to 35.9 in adult  -     Comprehensive Metabolic Panel; Future    4. Vitamin D deficiency  -     Vitamin D 25 Hydroxy; Future    5. Osteopenia of spine  -     Vitamin D  25 Hydroxy; Future    6. White coat syndrome with high blood pressure but without hypertension  -     Comprehensive Metabolic Panel; Future    7. Generalized anxiety disorder  -     TSH; Future    8. Encounter for hepatitis C screening test for low risk patient  -     Hepatitis C Antibody; Future      I spent 33 minutes caring for Janet on this date of service. This time includes time spent by me in the following activities:preparing for the visit, reviewing tests, obtaining and/or reviewing a separately obtained history, performing a medically appropriate examination and/or evaluation , counseling and educating the patient/family/caregiver, ordering medications, tests, or procedures and documenting information in the medical record     I gave patient a goal of gradual 15-pound weight loss.  Recommended low-fat, low cholesterol, low carbohydrate diet, and exercise to acheive weight loss goals, which will help delay progression of impaired fasting and lower cholesterol.   I recommended she resume walking for exercise with the warmer spring weather.      Monitor BP at home and notify me if not consistently at goal.  Pursue weight loss.       I recommended daily Miralax daily to help manage constipation caused by vitamin D supplement, titrating the dose to effect.  She is in agreement to restart OTC vitamin D 2000 IU daily.   Notify us if she cannot tolerate the vitamin D supplement.  We discussed importance of getting her vitamin D at goal with her osteopenia demonstrated on DEXA last week.   Pursue weightbearing activity.       Return to clinic in 12 months with fasting labs prior.      Scribed for Dr. Arzate by Haley Sheriff [facility].     Follow Up   Return in about 1 year (around 3/14/2023) for Next scheduled follow up, Next scheduled follow up - labs 1 week prior.  Patient was given instructions and counseling regarding her condition or for health maintenance advice. Please see specific information pulled into  the AVS if appropriate.

## 2023-03-14 ENCOUNTER — LAB (OUTPATIENT)
Dept: LAB | Facility: OTHER | Age: 63
End: 2023-03-14
Payer: MEDICAID

## 2023-03-14 DIAGNOSIS — R03.0 WHITE COAT SYNDROME WITH HIGH BLOOD PRESSURE BUT WITHOUT HYPERTENSION: Chronic | ICD-10-CM

## 2023-03-14 DIAGNOSIS — F41.1 GENERALIZED ANXIETY DISORDER: Chronic | ICD-10-CM

## 2023-03-14 DIAGNOSIS — E78.2 MIXED HYPERLIPIDEMIA: Chronic | ICD-10-CM

## 2023-03-14 DIAGNOSIS — M85.88 OSTEOPENIA OF SPINE: Chronic | ICD-10-CM

## 2023-03-14 DIAGNOSIS — R73.01 IFG (IMPAIRED FASTING GLUCOSE): Chronic | ICD-10-CM

## 2023-03-14 DIAGNOSIS — Z11.59 ENCOUNTER FOR HEPATITIS C SCREENING TEST FOR LOW RISK PATIENT: ICD-10-CM

## 2023-03-14 DIAGNOSIS — E55.9 VITAMIN D DEFICIENCY: Chronic | ICD-10-CM

## 2023-03-14 DIAGNOSIS — E66.09 CLASS 2 OBESITY DUE TO EXCESS CALORIES WITHOUT SERIOUS COMORBIDITY WITH BODY MASS INDEX (BMI) OF 35.0 TO 35.9 IN ADULT: Chronic | ICD-10-CM

## 2023-03-14 LAB
25(OH)D3 SERPL-MCNC: 21.3 NG/ML (ref 30–100)
ALBUMIN SERPL-MCNC: 4.3 G/DL (ref 3.5–5)
ALBUMIN/GLOB SERPL: 1.4 G/DL (ref 1.1–1.8)
ALP SERPL-CCNC: 86 U/L (ref 38–126)
ALT SERPL W P-5'-P-CCNC: 25 U/L
ANION GAP SERPL CALCULATED.3IONS-SCNC: 6 MMOL/L (ref 5–15)
AST SERPL-CCNC: 30 U/L (ref 14–36)
BASOPHILS # BLD AUTO: 0.04 10*3/MM3 (ref 0–0.2)
BASOPHILS NFR BLD AUTO: 0.8 % (ref 0–1.5)
BILIRUB SERPL-MCNC: 0.8 MG/DL (ref 0.2–1.3)
BUN SERPL-MCNC: 18 MG/DL (ref 7–23)
BUN/CREAT SERPL: 21.2 (ref 7–25)
CALCIUM SPEC-SCNC: 9 MG/DL (ref 8.4–10.2)
CHLORIDE SERPL-SCNC: 105 MMOL/L (ref 101–112)
CHOLEST SERPL-MCNC: 206 MG/DL (ref 150–200)
CO2 SERPL-SCNC: 30 MMOL/L (ref 22–30)
CREAT SERPL-MCNC: 0.85 MG/DL (ref 0.52–1.04)
DEPRECATED RDW RBC AUTO: 46.4 FL (ref 37–54)
EGFRCR SERPLBLD CKD-EPI 2021: 78.1 ML/MIN/1.73
EOSINOPHIL # BLD AUTO: 0.14 10*3/MM3 (ref 0–0.4)
EOSINOPHIL NFR BLD AUTO: 2.8 % (ref 0.3–6.2)
ERYTHROCYTE [DISTWIDTH] IN BLOOD BY AUTOMATED COUNT: 14.1 % (ref 12.3–15.4)
GLOBULIN UR ELPH-MCNC: 3 GM/DL (ref 2.3–3.5)
GLUCOSE SERPL-MCNC: 109 MG/DL (ref 70–99)
HBA1C MFR BLD: 5.3 % (ref 4.8–5.6)
HCT VFR BLD AUTO: 41.8 % (ref 34–46.6)
HCV AB SER DONR QL: NORMAL
HDLC SERPL-MCNC: 47 MG/DL (ref 40–59)
HGB BLD-MCNC: 13.7 G/DL (ref 12–15.9)
LDLC SERPL CALC-MCNC: 130 MG/DL
LDLC/HDLC SERPL: 2.68 {RATIO} (ref 0–3.22)
LYMPHOCYTES # BLD AUTO: 1.43 10*3/MM3 (ref 0.7–3.1)
LYMPHOCYTES NFR BLD AUTO: 28.5 % (ref 19.6–45.3)
MCH RBC QN AUTO: 30.2 PG (ref 26.6–33)
MCHC RBC AUTO-ENTMCNC: 32.8 G/DL (ref 31.5–35.7)
MCV RBC AUTO: 92.1 FL (ref 79–97)
MONOCYTES # BLD AUTO: 0.42 10*3/MM3 (ref 0.1–0.9)
MONOCYTES NFR BLD AUTO: 8.4 % (ref 5–12)
NEUTROPHILS NFR BLD AUTO: 2.99 10*3/MM3 (ref 1.7–7)
NEUTROPHILS NFR BLD AUTO: 59.5 % (ref 42.7–76)
PLATELET # BLD AUTO: 190 10*3/MM3 (ref 140–450)
PMV BLD AUTO: 10.3 FL (ref 6–12)
POTASSIUM SERPL-SCNC: 3.6 MMOL/L (ref 3.4–5)
PROT SERPL-MCNC: 7.3 G/DL (ref 6.3–8.6)
RBC # BLD AUTO: 4.54 10*6/MM3 (ref 3.77–5.28)
SODIUM SERPL-SCNC: 141 MMOL/L (ref 137–145)
TRIGL SERPL-MCNC: 165 MG/DL
TSH SERPL DL<=0.05 MIU/L-ACNC: 2.82 UIU/ML (ref 0.27–4.2)
VLDLC SERPL-MCNC: 29 MG/DL (ref 5–40)
WBC NRBC COR # BLD: 5.02 10*3/MM3 (ref 3.4–10.8)

## 2023-03-14 PROCEDURE — 84443 ASSAY THYROID STIM HORMONE: CPT | Performed by: INTERNAL MEDICINE

## 2023-03-14 PROCEDURE — 82306 VITAMIN D 25 HYDROXY: CPT | Performed by: INTERNAL MEDICINE

## 2023-03-14 PROCEDURE — 83036 HEMOGLOBIN GLYCOSYLATED A1C: CPT | Performed by: INTERNAL MEDICINE

## 2023-03-14 PROCEDURE — 80061 LIPID PANEL: CPT | Performed by: INTERNAL MEDICINE

## 2023-03-14 PROCEDURE — 86803 HEPATITIS C AB TEST: CPT | Performed by: INTERNAL MEDICINE

## 2023-03-14 PROCEDURE — 85025 COMPLETE CBC W/AUTO DIFF WBC: CPT | Performed by: INTERNAL MEDICINE

## 2023-03-14 PROCEDURE — 80053 COMPREHEN METABOLIC PANEL: CPT | Performed by: INTERNAL MEDICINE

## 2023-03-21 ENCOUNTER — OFFICE VISIT (OUTPATIENT)
Dept: FAMILY MEDICINE CLINIC | Facility: CLINIC | Age: 63
End: 2023-03-21
Payer: MEDICAID

## 2023-03-21 VITALS
OXYGEN SATURATION: 98 % | HEIGHT: 63 IN | BODY MASS INDEX: 36.46 KG/M2 | HEART RATE: 88 BPM | DIASTOLIC BLOOD PRESSURE: 80 MMHG | SYSTOLIC BLOOD PRESSURE: 144 MMHG | WEIGHT: 205.8 LBS | TEMPERATURE: 98.8 F

## 2023-03-21 DIAGNOSIS — M85.88 OSTEOPENIA OF SPINE: Chronic | ICD-10-CM

## 2023-03-21 DIAGNOSIS — R03.0 WHITE COAT SYNDROME WITH HIGH BLOOD PRESSURE BUT WITHOUT HYPERTENSION: Chronic | ICD-10-CM

## 2023-03-21 DIAGNOSIS — E78.2 MIXED HYPERLIPIDEMIA: Primary | Chronic | ICD-10-CM

## 2023-03-21 DIAGNOSIS — R73.01 IFG (IMPAIRED FASTING GLUCOSE): Chronic | ICD-10-CM

## 2023-03-21 DIAGNOSIS — R53.83 FATIGUE, UNSPECIFIED TYPE: ICD-10-CM

## 2023-03-21 DIAGNOSIS — E55.9 VITAMIN D DEFICIENCY: Chronic | ICD-10-CM

## 2023-03-21 DIAGNOSIS — E66.09 CLASS 2 OBESITY DUE TO EXCESS CALORIES WITHOUT SERIOUS COMORBIDITY WITH BODY MASS INDEX (BMI) OF 36.0 TO 36.9 IN ADULT: Chronic | ICD-10-CM

## 2023-03-21 DIAGNOSIS — I87.2 PERIPHERAL VENOUS INSUFFICIENCY: Chronic | ICD-10-CM

## 2023-03-21 RX ORDER — MELATONIN
2000 DAILY
COMMUNITY

## 2023-03-21 RX ORDER — CHOLECALCIFEROL (VITAMIN D3) 125 MCG
500 CAPSULE ORAL EVERY OTHER DAY
COMMUNITY

## 2023-03-21 RX ORDER — MELATONIN
1000 DAILY
COMMUNITY
End: 2023-03-21

## 2023-03-21 NOTE — PROGRESS NOTES
"Chief Complaint  Annual Exam    Subjective        History of Present Illness     Janet Mejia presents to the office e for annual follow up on medical issues including obesity, mild hyperlipidemia, osteopenia of the lumbar spine, osteoarthritis/DJD, mild impaired fasting glucose, and chronic venous insufficiency with history of superficial venous thrombosis of the right distal greater saphenous vein treated with six months of Coumadin with no evidence of recurrence. Denies chest pain.     DEXA 03/11/2022 revealed decline in lumbar density consistent with osteopenia of the lumbar spine with improved density of the hip.  Vitamim D has continued to gradually improve, but remains on low side at 21.3.  A couple of weeks ago, she resumed a calcium, magnesium, and zinc combination vitamin including vitamin D 1000 units, which has not been constipating her.  Prior to that, her vitamin D supplement had been causing some constipation.      Weight is up 10 pounds in the past year.   Labs continue to reveal IFG, although, not progressed to diabetes. She admits she has not been meeting diet and exercise goals and has been eating a lot of high fat foods such as potato chips and fries.   We discussed importance of carving out time for regular exercise in combinations with reducing carbohydrates and portion sizes.       BP above goal at 144/80.  She has a history of white coat hypertension.  She brings in a BP diary revealing excellent BP at home.       Lab results are reviewed with the patient today.  Fasting glucose 109.  A1c 5.3.  Normal renal and liver function.   Normal thyroid screen.  Total and LDL cholesterol above goal with cholesterol ratio slightly above 2.6.  We checked hepatitis C screen, which is negative.        Objective   Vital Signs:  /80 Comment: staets white coat syndrome  Pulse 88   Temp 98.8 °F (37.1 °C)   Ht 160 cm (63\")   Wt 93.4 kg (205 lb 12.8 oz)   SpO2 98%   BMI 36.46 kg/m²   Estimated body " "mass index is 36.46 kg/m² as calculated from the following:    Height as of this encounter: 160 cm (63\").    Weight as of this encounter: 93.4 kg (205 lb 12.8 oz).             Physical Exam  Vitals reviewed.   Constitutional:       General: She is not in acute distress.     Appearance: She is well-developed.      Comments: Pleasant female.  Obese.    HENT:      Head: Normocephalic and atraumatic.      Nose:      Right Sinus: No maxillary sinus tenderness or frontal sinus tenderness.      Left Sinus: No maxillary sinus tenderness or frontal sinus tenderness.      Mouth/Throat:      Mouth: No oral lesions.      Pharynx: Uvula midline.      Tonsils: No tonsillar exudate.   Eyes:      Conjunctiva/sclera: Conjunctivae normal.      Pupils: Pupils are equal, round, and reactive to light.   Neck:      Thyroid: No thyroid mass or thyromegaly.      Vascular: No carotid bruit or JVD.      Trachea: Trachea normal. No tracheal deviation.   Cardiovascular:      Rate and Rhythm: Normal rate and regular rhythm.  No extrasystoles are present.     Chest Wall: PMI is not displaced.      Heart sounds: Normal heart sounds. No murmur heard.  Pulmonary:      Effort: Pulmonary effort is normal. No accessory muscle usage or respiratory distress.      Breath sounds: Normal breath sounds. No decreased breath sounds, wheezing, rhonchi or rales.   Abdominal:      General: Bowel sounds are normal. There is no distension.      Palpations: Abdomen is soft.      Tenderness: There is no abdominal tenderness.      Comments: Overweight abdomen    Musculoskeletal:      Cervical back: Neck supple.   Lymphadenopathy:      Cervical: No cervical adenopathy.   Skin:     General: Skin is warm and dry.      Findings: No rash.      Nails: There is no clubbing.   Neurological:      Mental Status: She is alert and oriented to person, place, and time.      Cranial Nerves: No cranial nerve deficit.      Coordination: Coordination normal.   Psychiatric:         " Speech: Speech normal.         Behavior: Behavior normal.         Thought Content: Thought content normal.         Judgment: Judgment normal.            Result Review :    CMP    CMP 3/14/23   Glucose 109 (A)   BUN 18   Creatinine 0.85   eGFR 78.1   Sodium 141   Potassium 3.6   Chloride 105   Calcium 9.0   Total Protein 7.3   Albumin 4.3   Globulin 3.0   Total Bilirubin 0.8   Alkaline Phosphatase 86   AST (SGOT) 30   ALT (SGPT) 25   Albumin/Globulin Ratio 1.4   BUN/Creatinine Ratio 21.2   Anion Gap 6.0   (A) Abnormal value            CBC w/diff    CBC w/Diff 3/14/23   WBC 5.02   RBC 4.54   Hemoglobin 13.7   Hematocrit 41.8   MCV 92.1   MCH 30.2   MCHC 32.8   RDW 14.1   Platelets 190   Neutrophil Rel % 59.5   Lymphocyte Rel % 28.5   Monocyte Rel % 8.4   Eosinophil Rel % 2.8   Basophil Rel % 0.8           Lipid Panel    Lipid Panel 3/14/23   Total Cholesterol 206 (A)   Triglycerides 165 (A)   HDL Cholesterol 47   VLDL Cholesterol 29   LDL Cholesterol  130 (A)   LDL/HDL Ratio 2.68   (A) Abnormal value            TSH    TSH 3/14/23   TSH 2.820           A1C Last 3 Results    HGBA1C Last 3 Results 3/14/23   Hemoglobin A1C 5.30           Data reviewed: Radiologic studies DEXA 03/11/2022 and BP diary              Assessment and Plan   Diagnoses and all orders for this visit:    1. Mixed hyperlipidemia (Primary)  -     Lipid Panel; Future    2. IFG (impaired fasting glucose)  -     Comprehensive Metabolic Panel; Future  -     Hemoglobin A1c; Future    3. White coat syndrome with high blood pressure but without hypertension  -     Comprehensive Metabolic Panel; Future    4. Vitamin D deficiency  -     Vitamin D,25-Hydroxy; Future    5. Class 2 obesity due to excess calories without serious comorbidity with body mass index (BMI) of 36.0 to 36.9 in adult  -     Comprehensive Metabolic Panel; Future    6. Osteopenia of spine  -     Vitamin D,25-Hydroxy; Future    7. Peripheral venous insufficiency    8. Fatigue, unspecified  type  -     CBC Auto Differential; Future  -     Magnesium; Future  -     TSH; Future  -     Vitamin B12; Future           I spent 33 minutes caring for Janet on this date of service. This time includes time spent by me in the following activities:preparing for the visit, reviewing tests, performing a medically appropriate examination and/or evaluation , counseling and educating the patient/family/caregiver, ordering medications, tests, or procedures and documenting information in the medical record     I asked patient to increase her calcium, magnesium, and zinc combination vitamin including vitamin D 1000 units to take 2 tablets daily for vitamin D 2000 IU daily to help increase her vitamin D level.  She will be due repeat DEXA 03/2024 to reassess her osteopenia.  We will add magnesium level with next set of labs since the vitamin supplement she is taking includes magnesium, as well as vitamin D and some zinc.      Increase physical activity, making a goal to carve out time for regular exercise in combination with reducing carbohydrates and concentrated sweets to help delay progression of IFG, address obesity and lower cholesterol.  Regular exercise will help raise HDL cholesterol. Reduce the high fat foods, which she reports she is eating frequently.  She reports significant intake of fried foods and potato chips.    Continue p.r.n. use of  Prilosec with OTC antacids for GERD flares. Constipation improved with the magnesium supplement.      Blood pressure appears to be adequately controlled.  Continue to monitor blood pressure at home.  Intensify efforts at sodium restriction and weight loss.  I encouraged her to start daily exercise/physical activity such as walking for exercise.  She does not voice motivation to do so, reporting that she is already busy during the day.        She continues to decline flu vaccines, COVID booster, Shingrix, and TDAP.       Monitor BP at home and notify me if not consistently at  goal.  Pursue weight loss.  She has white coat hypertension, for which her BP is normrally slightly above goal with office visits.     She has been taking B-12 q.o.d. We will add B-12 level with next set of labs.     Return in 12 months for routine follow up with fasting labs one week prior or sooner if needed    Scribed for Dr. Arzate by Haley Sheriff [facility].     Follow Up   Return in about 1 year (around 3/21/2024) for Next scheduled follow up, Next scheduled follow up - labs 1 week prior.  Patient was given instructions and counseling regarding her condition or for health maintenance advice. Please see specific information pulled into the AVS if appropriate.

## 2023-03-21 NOTE — PATIENT INSTRUCTIONS
Calorie Counting for Weight Loss  Calories are units of energy. Your body needs a certain number of calories from food to keep going throughout the day. When you eat or drink more calories than your body needs, your body stores the extra calories mostly as fat. When you eat or drink fewer calories than your body needs, your body burns fat to get the energy it needs.  Calorie counting means keeping track of how many calories you eat and drink each day. Calorie counting can be helpful if you need to lose weight. If you eat fewer calories than your body needs, you should lose weight. Ask your health care provider what a healthy weight is for you.  For calorie counting to work, you will need to eat the right number of calories each day to lose a healthy amount of weight per week. A dietitian can help you figure out how many calories you need in a day and will suggest ways to reach your calorie goal.  A healthy amount of weight to lose each week is usually 1-2 lb (0.5-0.9 kg). This usually means that your daily calorie intake should be reduced by 500-750 calories.  Eating 1,200-1,500 calories a day can help most women lose weight.  Eating 1,500-1,800 calories a day can help most men lose weight.  What do I need to know about calorie counting?  Work with your health care provider or dietitian to determine how many calories you should get each day. To meet your daily calorie goal, you will need to:  Find out how many calories are in each food that you would like to eat. Try to do this before you eat.  Decide how much of the food you plan to eat.  Keep a food log. Do this by writing down what you ate and how many calories it had.  To successfully lose weight, it is important to balance calorie counting with a healthy lifestyle that includes regular activity.  Where do I find calorie information?  The number of calories in a food can be found on a Nutrition Facts label. If a food does not have a Nutrition Facts label, try to  look up the calories online or ask your dietitian for help.  Remember that calories are listed per serving. If you choose to have more than one serving of a food, you will have to multiply the calories per serving by the number of servings you plan to eat. For example, the label on a package of bread might say that a serving size is 1 slice and that there are 90 calories in a serving. If you eat 1 slice, you will have eaten 90 calories. If you eat 2 slices, you will have eaten 180 calories.  How do I keep a food log?  After each time that you eat, record the following in your food log as soon as possible:  What you ate. Be sure to include toppings, sauces, and other extras on the food.  How much you ate. This can be measured in cups, ounces, or number of items.  How many calories were in each food and drink.  The total number of calories in the food you ate.  Keep your food log near you, such as in a pocket-sized notebook or on an luzmaria or website on your mobile phone. Some programs will calculate calories for you and show you how many calories you have left to meet your daily goal.  What are some portion-control tips?  Know how many calories are in a serving. This will help you know how many servings you can have of a certain food.  Use a measuring cup to measure serving sizes. You could also try weighing out portions on a kitchen scale. With time, you will be able to estimate serving sizes for some foods.  Take time to put servings of different foods on your favorite plates or in your favorite bowls and cups so you know what a serving looks like.  Try not to eat straight from a food's packaging, such as from a bag or box. Eating straight from the package makes it hard to see how much you are eating and can lead to overeating. Put the amount you would like to eat in a cup or on a plate to make sure you are eating the right portion.  Use smaller plates, glasses, and bowls for smaller portions and to prevent  overeating.  Try not to multitask. For example, avoid watching TV or using your computer while eating. If it is time to eat, sit down at a table and enjoy your food. This will help you recognize when you are full. It will also help you be more mindful of what and how much you are eating.  What are tips for following this plan?  Reading food labels  Check the calorie count compared with the serving size. The serving size may be smaller than what you are used to eating.  Check the source of the calories. Try to choose foods that are high in protein, fiber, and vitamins, and low in saturated fat, trans fat, and sodium.  Shopping  Read nutrition labels while you shop. This will help you make healthy decisions about which foods to buy.  Pay attention to nutrition labels for low-fat or fat-free foods. These foods sometimes have the same number of calories or more calories than the full-fat versions. They also often have added sugar, starch, or salt to make up for flavor that was removed with the fat.  Make a grocery list of lower-calorie foods and stick to it.  Cooking  Try to cook your favorite foods in a healthier way. For example, try baking instead of frying.  Use low-fat dairy products.  Meal planning  Use more fruits and vegetables. One-half of your plate should be fruits and vegetables.  Include lean proteins, such as chicken, turkey, and fish.  Lifestyle  Each week, aim to do one of the followin minutes of moderate exercise, such as walking.  75 minutes of vigorous exercise, such as running.  General information  Know how many calories are in the foods you eat most often. This will help you calculate calorie counts faster.  Find a way of tracking calories that works for you. Get creative. Try different apps or programs if writing down calories does not work for you.  What foods should I eat?    Eat nutritious foods. It is better to have a nutritious, high-calorie food, such as an avocado, than a food with  few nutrients, such as a bag of potato chips.  Use your calories on foods and drinks that will fill you up and will not leave you hungry soon after eating.  Examples of foods that fill you up are nuts and nut butters, vegetables, lean proteins, and high-fiber foods such as whole grains. High-fiber foods are foods with more than 5 g of fiber per serving.  Pay attention to calories in drinks. Low-calorie drinks include water and unsweetened drinks.  The items listed above may not be a complete list of foods and beverages you can eat. Contact a dietitian for more information.  What foods should I limit?  Limit foods or drinks that are not good sources of vitamins, minerals, or protein or that are high in unhealthy fats. These include:  Candy.  Other sweets.  Sodas, specialty coffee drinks, alcohol, and juice.  The items listed above may not be a complete list of foods and beverages you should avoid. Contact a dietitian for more information.  How do I count calories when eating out?  Pay attention to portions. Often, portions are much larger when eating out. Try these tips to keep portions smaller:  Consider sharing a meal instead of getting your own.  If you get your own meal, eat only half of it. Before you start eating, ask for a container and put half of your meal into it.  When available, consider ordering smaller portions from the menu instead of full portions.  Pay attention to your food and drink choices. Knowing the way food is cooked and what is included with the meal can help you eat fewer calories.  If calories are listed on the menu, choose the lower-calorie options.  Choose dishes that include vegetables, fruits, whole grains, low-fat dairy products, and lean proteins.  Choose items that are boiled, broiled, grilled, or steamed. Avoid items that are buttered, battered, fried, or served with cream sauce. Items labeled as crispy are usually fried, unless stated otherwise.  Choose water, low-fat milk,  unsweetened iced tea, or other drinks without added sugar. If you want an alcoholic beverage, choose a lower-calorie option, such as a glass of wine or light beer.  Ask for dressings, sauces, and syrups on the side. These are usually high in calories, so you should limit the amount you eat.  If you want a salad, choose a garden salad and ask for grilled meats. Avoid extra toppings such as klein, cheese, or fried items. Ask for the dressing on the side, or ask for olive oil and vinegar or lemon to use as dressing.  Estimate how many servings of a food you are given. Knowing serving sizes will help you be aware of how much food you are eating at restaurants.  Where to find more information  Centers for Disease Control and Prevention: www.cdc.gov  [place] Department of Agriculture: myplate.gov  Summary  Calorie counting means keeping track of how many calories you eat and drink each day. If you eat fewer calories than your body needs, you should lose weight.  A healthy amount of weight to lose per week is usually 1-2 lb (0.5-0.9 kg). This usually means reducing your daily calorie intake by 500-750 calories.  The number of calories in a food can be found on a Nutrition Facts label. If a food does not have a Nutrition Facts label, try to look up the calories online or ask your dietitian for help.  Use smaller plates, glasses, and bowls for smaller portions and to prevent overeating.  Use your calories on foods and drinks that will fill you up and not leave you hungry shortly after a meal.  This information is not intended to replace advice given to you by your health care provider. Make sure you discuss any questions you have with your health care provider.  Document Revised: 01/28/2021 Document Reviewed: 01/28/2021  [employer]vier Patient Education © 2022 [employer]

## 2023-07-28 ENCOUNTER — TELEPHONE (OUTPATIENT)
Dept: FAMILY MEDICINE CLINIC | Facility: CLINIC | Age: 63
End: 2023-07-28
Payer: MEDICAID

## 2023-07-28 NOTE — TELEPHONE ENCOUNTER
MUKESH Watkins 07/28/2023  [employer] contacted the office today requesting a fax number to send medical records forms to the office and to confirm the patient was under the providers care. I contacted the patient to see if she was aware of this request and she stated she was as she is attempting to obtain life insurance. Per patient request I clarified answers with company.